# Patient Record
Sex: MALE | Race: WHITE | Employment: FULL TIME | URBAN - METROPOLITAN AREA
[De-identification: names, ages, dates, MRNs, and addresses within clinical notes are randomized per-mention and may not be internally consistent; named-entity substitution may affect disease eponyms.]

---

## 2021-01-06 ENCOUNTER — HOSPITAL ENCOUNTER (INPATIENT)
Age: 36
LOS: 5 days | Discharge: HOME | DRG: 896 | End: 2021-01-11
Attending: EMERGENCY MEDICINE | Admitting: INTERNAL MEDICINE
Payer: COMMERCIAL

## 2021-01-06 ENCOUNTER — APPOINTMENT (OUTPATIENT)
Dept: CT IMAGING | Age: 36
DRG: 896 | End: 2021-01-06
Payer: COMMERCIAL

## 2021-01-06 DIAGNOSIS — E87.1 HYPONATREMIA: ICD-10-CM

## 2021-01-06 DIAGNOSIS — F10.11 HISTORY OF ALCOHOL ABUSE: ICD-10-CM

## 2021-01-06 DIAGNOSIS — R40.2422 GLASGOW COMA SCALE TOTAL SCORE 9-12, AT ARRIVAL TO EMERGENCY DEPARTMENT: Primary | ICD-10-CM

## 2021-01-06 PROBLEM — Z78.9 ALCOHOL USE: Chronic | Status: ACTIVE | Noted: 2021-01-06

## 2021-01-06 PROBLEM — R41.82 ALTERED MENTAL STATUS: Status: ACTIVE | Noted: 2021-01-06

## 2021-01-06 PROBLEM — S01.91XA LACERATION OF HEAD WITHOUT FOREIGN BODY: Status: ACTIVE | Noted: 2021-01-06

## 2021-01-06 LAB
ACETAMINOPHEN LEVEL: <5 UG/ML (ref 10–30)
ALBUMIN SERPL-MCNC: 4.3 G/DL (ref 3.5–4.6)
ALP BLD-CCNC: 76 U/L (ref 35–104)
ALT SERPL-CCNC: 34 U/L (ref 0–41)
AMMONIA: 28 UMOL/L (ref 16–60)
AMPHETAMINE SCREEN, URINE: NORMAL
ANION GAP SERPL CALCULATED.3IONS-SCNC: 8 MEQ/L (ref 9–15)
AST SERPL-CCNC: 81 U/L (ref 0–40)
BACTERIA: NEGATIVE /HPF
BARBITURATE SCREEN URINE: NORMAL
BASOPHILS ABSOLUTE: 0 K/UL (ref 0–0.2)
BASOPHILS RELATIVE PERCENT: 0.3 %
BENZODIAZEPINE SCREEN, URINE: NORMAL
BILIRUB SERPL-MCNC: 1.1 MG/DL (ref 0.2–0.7)
BILIRUBIN URINE: NEGATIVE
BLOOD, URINE: ABNORMAL
BUN BLDV-MCNC: 4 MG/DL (ref 6–20)
CALCIUM SERPL-MCNC: 8.3 MG/DL (ref 8.5–9.9)
CANNABINOID SCREEN URINE: NORMAL
CHLORIDE BLD-SCNC: 80 MEQ/L (ref 95–107)
CLARITY: CLEAR
CO2: 24 MEQ/L (ref 20–31)
COCAINE METABOLITE SCREEN URINE: NORMAL
COLOR: YELLOW
CREAT SERPL-MCNC: 0.58 MG/DL (ref 0.7–1.2)
EOSINOPHILS ABSOLUTE: 0 K/UL (ref 0–0.7)
EOSINOPHILS RELATIVE PERCENT: 0.3 %
EPITHELIAL CELLS, UA: ABNORMAL /HPF (ref 0–5)
ETHANOL PERCENT: NORMAL G/DL
ETHANOL: <10 MG/DL (ref 0–0.08)
GFR AFRICAN AMERICAN: >60
GFR NON-AFRICAN AMERICAN: >60
GLOBULIN: 2.2 G/DL (ref 2.3–3.5)
GLUCOSE BLD-MCNC: 110 MG/DL (ref 70–99)
GLUCOSE URINE: NEGATIVE MG/DL
HCT VFR BLD CALC: 40.5 % (ref 42–52)
HEMOGLOBIN: 14.2 G/DL (ref 14–18)
HYALINE CASTS: ABNORMAL /HPF (ref 0–5)
KETONES, URINE: 15 MG/DL
LACTIC ACID: 1.9 MMOL/L (ref 0.5–2.2)
LEUKOCYTE ESTERASE, URINE: NEGATIVE
LYMPHOCYTES ABSOLUTE: 0.6 K/UL (ref 1–4.8)
LYMPHOCYTES RELATIVE PERCENT: 7.6 %
Lab: NORMAL
MAGNESIUM: 1.8 MG/DL (ref 1.7–2.4)
MCH RBC QN AUTO: 30.9 PG (ref 27–31.3)
MCHC RBC AUTO-ENTMCNC: 35.1 % (ref 33–37)
MCV RBC AUTO: 88.2 FL (ref 80–100)
METHADONE SCREEN, URINE: NORMAL
MONOCYTES ABSOLUTE: 0.7 K/UL (ref 0.2–0.8)
MONOCYTES RELATIVE PERCENT: 9.4 %
NEUTROPHILS ABSOLUTE: 6.1 K/UL (ref 1.4–6.5)
NEUTROPHILS RELATIVE PERCENT: 82.4 %
NITRITE, URINE: NEGATIVE
OPIATE SCREEN URINE: NORMAL
OXYCODONE URINE: NORMAL
PDW BLD-RTO: 12.9 % (ref 11.5–14.5)
PH UA: 7 (ref 5–9)
PHENCYCLIDINE SCREEN URINE: NORMAL
PLATELET # BLD: 139 K/UL (ref 130–400)
POTASSIUM SERPL-SCNC: 3.5 MEQ/L (ref 3.4–4.9)
PROPOXYPHENE SCREEN: NORMAL
PROTEIN UA: NEGATIVE MG/DL
RBC # BLD: 4.59 M/UL (ref 4.7–6.1)
RBC UA: ABNORMAL /HPF (ref 0–5)
SALICYLATE, SERUM: <0.3 MG/DL (ref 15–30)
SARS-COV-2, NAAT: NOT DETECTED
SODIUM BLD-SCNC: 112 MEQ/L (ref 135–144)
SODIUM BLD-SCNC: 115 MEQ/L (ref 135–144)
SPECIFIC GRAVITY UA: 1 (ref 1–1.03)
TOTAL PROTEIN: 6.5 G/DL (ref 6.3–8)
URINE REFLEX TO CULTURE: ABNORMAL
UROBILINOGEN, URINE: 0.2 E.U./DL
WBC # BLD: 7.4 K/UL (ref 4.8–10.8)
WBC UA: ABNORMAL /HPF (ref 0–5)

## 2021-01-06 PROCEDURE — 6360000002 HC RX W HCPCS: Performed by: EMERGENCY MEDICINE

## 2021-01-06 PROCEDURE — 83935 ASSAY OF URINE OSMOLALITY: CPT

## 2021-01-06 PROCEDURE — 82140 ASSAY OF AMMONIA: CPT

## 2021-01-06 PROCEDURE — 96372 THER/PROPH/DIAG INJ SC/IM: CPT

## 2021-01-06 PROCEDURE — 99284 EMERGENCY DEPT VISIT MOD MDM: CPT

## 2021-01-06 PROCEDURE — 83735 ASSAY OF MAGNESIUM: CPT

## 2021-01-06 PROCEDURE — 2000000000 HC ICU R&B

## 2021-01-06 PROCEDURE — 70450 CT HEAD/BRAIN W/O DYE: CPT

## 2021-01-06 PROCEDURE — 84295 ASSAY OF SERUM SODIUM: CPT

## 2021-01-06 PROCEDURE — 84300 ASSAY OF URINE SODIUM: CPT

## 2021-01-06 PROCEDURE — G0480 DRUG TEST DEF 1-7 CLASSES: HCPCS

## 2021-01-06 PROCEDURE — 72125 CT NECK SPINE W/O DYE: CPT

## 2021-01-06 PROCEDURE — 80307 DRUG TEST PRSMV CHEM ANLYZR: CPT

## 2021-01-06 PROCEDURE — 2580000003 HC RX 258: Performed by: NURSE PRACTITIONER

## 2021-01-06 PROCEDURE — 83605 ASSAY OF LACTIC ACID: CPT

## 2021-01-06 PROCEDURE — 2580000003 HC RX 258: Performed by: EMERGENCY MEDICINE

## 2021-01-06 PROCEDURE — 96375 TX/PRO/DX INJ NEW DRUG ADDON: CPT

## 2021-01-06 PROCEDURE — 81001 URINALYSIS AUTO W/SCOPE: CPT

## 2021-01-06 PROCEDURE — 2500000003 HC RX 250 WO HCPCS: Performed by: EMERGENCY MEDICINE

## 2021-01-06 PROCEDURE — 80053 COMPREHEN METABOLIC PANEL: CPT

## 2021-01-06 PROCEDURE — 6360000002 HC RX W HCPCS

## 2021-01-06 PROCEDURE — 96374 THER/PROPH/DIAG INJ IV PUSH: CPT

## 2021-01-06 PROCEDURE — 96376 TX/PRO/DX INJ SAME DRUG ADON: CPT

## 2021-01-06 PROCEDURE — 36415 COLL VENOUS BLD VENIPUNCTURE: CPT

## 2021-01-06 PROCEDURE — 85025 COMPLETE CBC W/AUTO DIFF WBC: CPT

## 2021-01-06 PROCEDURE — U0002 COVID-19 LAB TEST NON-CDC: HCPCS

## 2021-01-06 PROCEDURE — 82570 ASSAY OF URINE CREATININE: CPT

## 2021-01-06 RX ORDER — PROMETHAZINE HYDROCHLORIDE 12.5 MG/1
12.5 TABLET ORAL EVERY 6 HOURS PRN
Status: DISCONTINUED | OUTPATIENT
Start: 2021-01-06 | End: 2021-01-11 | Stop reason: HOSPADM

## 2021-01-06 RX ORDER — LANOLIN ALCOHOL/MO/W.PET/CERES
100 CREAM (GRAM) TOPICAL DAILY
Status: DISCONTINUED | OUTPATIENT
Start: 2021-01-07 | End: 2021-01-07

## 2021-01-06 RX ORDER — LORAZEPAM 2 MG/ML
INJECTION INTRAMUSCULAR
Status: COMPLETED
Start: 2021-01-06 | End: 2021-01-06

## 2021-01-06 RX ORDER — LORAZEPAM 2 MG/ML
2 INJECTION INTRAMUSCULAR ONCE
Status: COMPLETED | OUTPATIENT
Start: 2021-01-06 | End: 2021-01-06

## 2021-01-06 RX ORDER — ACETAMINOPHEN 650 MG/1
650 SUPPOSITORY RECTAL EVERY 6 HOURS PRN
Status: DISCONTINUED | OUTPATIENT
Start: 2021-01-06 | End: 2021-01-11 | Stop reason: HOSPADM

## 2021-01-06 RX ORDER — LORAZEPAM 2 MG/ML
2 INJECTION INTRAMUSCULAR
Status: DISCONTINUED | OUTPATIENT
Start: 2021-01-06 | End: 2021-01-11 | Stop reason: HOSPADM

## 2021-01-06 RX ORDER — ACETAMINOPHEN 325 MG/1
650 TABLET ORAL EVERY 6 HOURS PRN
Status: DISCONTINUED | OUTPATIENT
Start: 2021-01-06 | End: 2021-01-11 | Stop reason: HOSPADM

## 2021-01-06 RX ORDER — LORAZEPAM 1 MG/1
3 TABLET ORAL
Status: DISCONTINUED | OUTPATIENT
Start: 2021-01-06 | End: 2021-01-11 | Stop reason: HOSPADM

## 2021-01-06 RX ORDER — 3% SODIUM CHLORIDE 3 G/100ML
40 INJECTION, SOLUTION INTRAVENOUS CONTINUOUS
Status: DISCONTINUED | OUTPATIENT
Start: 2021-01-06 | End: 2021-01-07

## 2021-01-06 RX ORDER — LORAZEPAM 2 MG/ML
3 INJECTION INTRAMUSCULAR
Status: DISCONTINUED | OUTPATIENT
Start: 2021-01-06 | End: 2021-01-11 | Stop reason: HOSPADM

## 2021-01-06 RX ORDER — CHLORDIAZEPOXIDE HYDROCHLORIDE 25 MG/1
50 CAPSULE, GELATIN COATED ORAL ONCE
Status: DISCONTINUED | OUTPATIENT
Start: 2021-01-06 | End: 2021-01-06

## 2021-01-06 RX ORDER — MULTIVITAMIN WITH IRON
1 TABLET ORAL DAILY
Status: DISCONTINUED | OUTPATIENT
Start: 2021-01-07 | End: 2021-01-11 | Stop reason: HOSPADM

## 2021-01-06 RX ORDER — SODIUM CHLORIDE 0.9 % (FLUSH) 0.9 %
10 SYRINGE (ML) INJECTION PRN
Status: DISCONTINUED | OUTPATIENT
Start: 2021-01-06 | End: 2021-01-11 | Stop reason: HOSPADM

## 2021-01-06 RX ORDER — LORAZEPAM 1 MG/1
4 TABLET ORAL
Status: DISCONTINUED | OUTPATIENT
Start: 2021-01-06 | End: 2021-01-11 | Stop reason: HOSPADM

## 2021-01-06 RX ORDER — ONDANSETRON 2 MG/ML
4 INJECTION INTRAMUSCULAR; INTRAVENOUS ONCE
Status: COMPLETED | OUTPATIENT
Start: 2021-01-06 | End: 2021-01-06

## 2021-01-06 RX ORDER — HALOPERIDOL 5 MG/ML
10 INJECTION INTRAMUSCULAR ONCE
Status: COMPLETED | OUTPATIENT
Start: 2021-01-06 | End: 2021-01-06

## 2021-01-06 RX ORDER — SODIUM CHLORIDE 0.9 % (FLUSH) 0.9 %
10 SYRINGE (ML) INJECTION EVERY 12 HOURS SCHEDULED
Status: DISCONTINUED | OUTPATIENT
Start: 2021-01-06 | End: 2021-01-11 | Stop reason: HOSPADM

## 2021-01-06 RX ORDER — LORAZEPAM 2 MG/ML
4 INJECTION INTRAMUSCULAR
Status: DISCONTINUED | OUTPATIENT
Start: 2021-01-06 | End: 2021-01-11 | Stop reason: HOSPADM

## 2021-01-06 RX ORDER — LORAZEPAM 1 MG/1
1 TABLET ORAL
Status: DISCONTINUED | OUTPATIENT
Start: 2021-01-06 | End: 2021-01-11 | Stop reason: HOSPADM

## 2021-01-06 RX ORDER — LORAZEPAM 2 MG/ML
1 INJECTION INTRAMUSCULAR
Status: DISCONTINUED | OUTPATIENT
Start: 2021-01-06 | End: 2021-01-11 | Stop reason: HOSPADM

## 2021-01-06 RX ORDER — ONDANSETRON 2 MG/ML
4 INJECTION INTRAMUSCULAR; INTRAVENOUS EVERY 6 HOURS PRN
Status: DISCONTINUED | OUTPATIENT
Start: 2021-01-06 | End: 2021-01-11 | Stop reason: HOSPADM

## 2021-01-06 RX ORDER — LORAZEPAM 1 MG/1
2 TABLET ORAL
Status: DISCONTINUED | OUTPATIENT
Start: 2021-01-06 | End: 2021-01-11 | Stop reason: HOSPADM

## 2021-01-06 RX ORDER — POLYETHYLENE GLYCOL 3350 17 G/17G
17 POWDER, FOR SOLUTION ORAL DAILY PRN
Status: DISCONTINUED | OUTPATIENT
Start: 2021-01-06 | End: 2021-01-11 | Stop reason: HOSPADM

## 2021-01-06 RX ADMIN — LORAZEPAM 2 MG: 2 INJECTION, SOLUTION INTRAMUSCULAR; INTRAVENOUS at 19:40

## 2021-01-06 RX ADMIN — ONDANSETRON 4 MG: 2 INJECTION INTRAMUSCULAR; INTRAVENOUS at 17:56

## 2021-01-06 RX ADMIN — HALOPERIDOL LACTATE 10 MG: 5 INJECTION, SOLUTION INTRAMUSCULAR at 18:38

## 2021-01-06 RX ADMIN — LORAZEPAM 2 MG: 2 INJECTION, SOLUTION INTRAMUSCULAR; INTRAVENOUS at 20:27

## 2021-01-06 RX ADMIN — THIAMINE HYDROCHLORIDE: 100 INJECTION, SOLUTION INTRAMUSCULAR; INTRAVENOUS at 17:52

## 2021-01-06 RX ADMIN — LORAZEPAM 2 MG: 2 INJECTION INTRAMUSCULAR; INTRAVENOUS at 22:47

## 2021-01-06 RX ADMIN — LORAZEPAM 2 MG: 2 INJECTION INTRAMUSCULAR at 17:15

## 2021-01-06 RX ADMIN — SODIUM CHLORIDE 40 ML/HR: 3 INJECTION, SOLUTION INTRAVENOUS at 21:20

## 2021-01-06 RX ADMIN — Medication 10 ML: at 23:28

## 2021-01-06 RX ADMIN — LORAZEPAM 2 MG: 2 INJECTION, SOLUTION INTRAMUSCULAR; INTRAVENOUS at 17:15

## 2021-01-06 ASSESSMENT — PAIN SCALES - GENERAL: PAINLEVEL_OUTOF10: 0

## 2021-01-06 NOTE — ED TRIAGE NOTES
Pt to ED with his wife after being found unresponsive at home on his kitchen floor with blood around him. Pt alert, speech incomprehensible. Follows few commands. Tremors noted. Small lac noted to right posterior head. Per his wife, he was a daily drinker and stopped on 1/1/21.

## 2021-01-06 NOTE — Clinical Note
Patient Class: Inpatient [101]   REQUIRED: Diagnosis: Hyponatremia [[id number]]   Estimated Length of Stay: Estimated stay of more than 2 midnights   Admitting Provider: Edson Hercules [[id number]]   Telemetry Bed Required?: Yes

## 2021-01-06 NOTE — ED NOTES
Patient to room #10 with wife at bedside. Patient will not follow any commands, pupils equal, round, react to light, patient noted to have abrasion with hematoma on right posterior of head. Patient noted to have tremors with sudden rapid movements. SL #18 placed to right AC. Unable to obtain blood pressure at this time due to patient pulling at cuff and rapidly moving arm.      Mariella Giles RN  01/06/21 5855

## 2021-01-06 NOTE — ED NOTES
Patient condition remains the same. Patient unable to take Librium.  to place further orders.       Princess Acevedo RN  01/06/21 6747

## 2021-01-06 NOTE — ED NOTES
Patient condition remains unchanged. Unable to stop rapid movements. Unable to go to CT at this time due to the inability to hold still and follow instructions. Mary Landry notified.       Samina Beal RN  01/06/21 3234

## 2021-01-07 ENCOUNTER — APPOINTMENT (OUTPATIENT)
Dept: GENERAL RADIOLOGY | Age: 36
DRG: 896 | End: 2021-01-07
Payer: COMMERCIAL

## 2021-01-07 LAB
ALBUMIN SERPL-MCNC: 3.9 G/DL (ref 3.5–4.6)
ALP BLD-CCNC: 71 U/L (ref 35–104)
ALT SERPL-CCNC: 37 U/L (ref 0–41)
ANION GAP SERPL CALCULATED.3IONS-SCNC: 10 MEQ/L (ref 9–15)
ANION GAP SERPL CALCULATED.3IONS-SCNC: 10 MEQ/L (ref 9–15)
ANION GAP SERPL CALCULATED.3IONS-SCNC: 12 MEQ/L (ref 9–15)
ANION GAP SERPL CALCULATED.3IONS-SCNC: 16 MEQ/L (ref 9–15)
ANION GAP SERPL CALCULATED.3IONS-SCNC: 16 MEQ/L (ref 9–15)
AST SERPL-CCNC: 111 U/L (ref 0–40)
BASOPHILS ABSOLUTE: 0 K/UL (ref 0–0.2)
BASOPHILS RELATIVE PERCENT: 0.2 %
BILIRUB SERPL-MCNC: 1.1 MG/DL (ref 0.2–0.7)
BUN BLDV-MCNC: 4 MG/DL (ref 6–20)
BUN BLDV-MCNC: 5 MG/DL (ref 6–20)
BUN BLDV-MCNC: 6 MG/DL (ref 6–20)
CALCIUM SERPL-MCNC: 8.3 MG/DL (ref 8.5–9.9)
CALCIUM SERPL-MCNC: 8.4 MG/DL (ref 8.5–9.9)
CALCIUM SERPL-MCNC: 8.6 MG/DL (ref 8.5–9.9)
CALCIUM SERPL-MCNC: 8.9 MG/DL (ref 8.5–9.9)
CALCIUM SERPL-MCNC: 9.3 MG/DL (ref 8.5–9.9)
CHLORIDE BLD-SCNC: 101 MEQ/L (ref 95–107)
CHLORIDE BLD-SCNC: 88 MEQ/L (ref 95–107)
CHLORIDE BLD-SCNC: 89 MEQ/L (ref 95–107)
CHLORIDE BLD-SCNC: 99 MEQ/L (ref 95–107)
CHLORIDE BLD-SCNC: 99 MEQ/L (ref 95–107)
CO2: 19 MEQ/L (ref 20–31)
CO2: 19 MEQ/L (ref 20–31)
CO2: 21 MEQ/L (ref 20–31)
CO2: 21 MEQ/L (ref 20–31)
CO2: 22 MEQ/L (ref 20–31)
CREAT SERPL-MCNC: 0.66 MG/DL (ref 0.7–1.2)
CREAT SERPL-MCNC: 0.68 MG/DL (ref 0.7–1.2)
CREAT SERPL-MCNC: 0.71 MG/DL (ref 0.7–1.2)
CREAT SERPL-MCNC: 0.73 MG/DL (ref 0.7–1.2)
CREAT SERPL-MCNC: 0.78 MG/DL (ref 0.7–1.2)
CREATININE URINE: 20.5 MG/DL
EOSINOPHILS ABSOLUTE: 0 K/UL (ref 0–0.7)
EOSINOPHILS RELATIVE PERCENT: 0.1 %
GFR AFRICAN AMERICAN: >60
GFR NON-AFRICAN AMERICAN: >60
GLOBULIN: 2.4 G/DL (ref 2.3–3.5)
GLUCOSE BLD-MCNC: 108 MG/DL (ref 60–115)
GLUCOSE BLD-MCNC: 112 MG/DL (ref 70–99)
GLUCOSE BLD-MCNC: 119 MG/DL (ref 60–115)
GLUCOSE BLD-MCNC: 120 MG/DL (ref 70–99)
GLUCOSE BLD-MCNC: 121 MG/DL (ref 70–99)
GLUCOSE BLD-MCNC: 128 MG/DL (ref 70–99)
GLUCOSE BLD-MCNC: 138 MG/DL (ref 60–115)
GLUCOSE BLD-MCNC: 143 MG/DL (ref 70–99)
HCT VFR BLD CALC: 38.4 % (ref 42–52)
HEMOGLOBIN: 13.5 G/DL (ref 14–18)
LYMPHOCYTES ABSOLUTE: 0.4 K/UL (ref 1–4.8)
LYMPHOCYTES RELATIVE PERCENT: 4.9 %
MAGNESIUM: 2.3 MG/DL (ref 1.7–2.4)
MCH RBC QN AUTO: 31.6 PG (ref 27–31.3)
MCHC RBC AUTO-ENTMCNC: 35.2 % (ref 33–37)
MCV RBC AUTO: 90 FL (ref 80–100)
MONOCYTES ABSOLUTE: 0.8 K/UL (ref 0.2–0.8)
MONOCYTES RELATIVE PERCENT: 11.2 %
NEUTROPHILS ABSOLUTE: 6.3 K/UL (ref 1.4–6.5)
NEUTROPHILS RELATIVE PERCENT: 83.6 %
OSMOLALITY URINE: 135 MOSM/KG (ref 300–900)
OSMOLALITY: 246 MOSM/KG (ref 280–303)
PDW BLD-RTO: 13.3 % (ref 11.5–14.5)
PERFORMED ON: ABNORMAL
PERFORMED ON: ABNORMAL
PERFORMED ON: NORMAL
PHOSPHORUS: 2.1 MG/DL (ref 2.3–4.8)
PHOSPHORUS: 2.4 MG/DL (ref 2.3–4.8)
PHOSPHORUS: 2.9 MG/DL (ref 2.3–4.8)
PHOSPHORUS: 3 MG/DL (ref 2.3–4.8)
PLATELET # BLD: 121 K/UL (ref 130–400)
POTASSIUM REFLEX MAGNESIUM: 3.3 MEQ/L (ref 3.4–4.9)
POTASSIUM REFLEX MAGNESIUM: 3.6 MEQ/L (ref 3.4–4.9)
POTASSIUM REFLEX MAGNESIUM: 3.6 MEQ/L (ref 3.4–4.9)
POTASSIUM REFLEX MAGNESIUM: 3.7 MEQ/L (ref 3.4–4.9)
POTASSIUM REFLEX MAGNESIUM: 3.9 MEQ/L (ref 3.4–4.9)
PROCALCITONIN: 0.2 NG/ML (ref 0–0.15)
RBC # BLD: 4.27 M/UL (ref 4.7–6.1)
SODIUM BLD-SCNC: 112 MEQ/L (ref 135–144)
SODIUM BLD-SCNC: 113 MEQ/L (ref 135–144)
SODIUM BLD-SCNC: 118 MEQ/L (ref 135–144)
SODIUM BLD-SCNC: 120 MEQ/L (ref 135–144)
SODIUM BLD-SCNC: 123 MEQ/L (ref 135–144)
SODIUM BLD-SCNC: 124 MEQ/L (ref 135–144)
SODIUM BLD-SCNC: 132 MEQ/L (ref 135–144)
SODIUM BLD-SCNC: 132 MEQ/L (ref 135–144)
SODIUM BLD-SCNC: 134 MEQ/L (ref 135–144)
SODIUM URINE: 30 MEQ/L
TOTAL PROTEIN: 6.3 G/DL (ref 6.3–8)
WBC # BLD: 7.6 K/UL (ref 4.8–10.8)

## 2021-01-07 PROCEDURE — 80053 COMPREHEN METABOLIC PANEL: CPT

## 2021-01-07 PROCEDURE — 85025 COMPLETE CBC W/AUTO DIFF WBC: CPT

## 2021-01-07 PROCEDURE — 2580000003 HC RX 258: Performed by: INTERNAL MEDICINE

## 2021-01-07 PROCEDURE — 84145 PROCALCITONIN (PCT): CPT

## 2021-01-07 PROCEDURE — 84295 ASSAY OF SERUM SODIUM: CPT

## 2021-01-07 PROCEDURE — 71045 X-RAY EXAM CHEST 1 VIEW: CPT

## 2021-01-07 PROCEDURE — 2500000003 HC RX 250 WO HCPCS: Performed by: INTERNAL MEDICINE

## 2021-01-07 PROCEDURE — 6370000000 HC RX 637 (ALT 250 FOR IP): Performed by: NURSE PRACTITIONER

## 2021-01-07 PROCEDURE — 87040 BLOOD CULTURE FOR BACTERIA: CPT

## 2021-01-07 PROCEDURE — 6360000002 HC RX W HCPCS: Performed by: INTERNAL MEDICINE

## 2021-01-07 PROCEDURE — 84100 ASSAY OF PHOSPHORUS: CPT

## 2021-01-07 PROCEDURE — 6370000000 HC RX 637 (ALT 250 FOR IP): Performed by: INTERNAL MEDICINE

## 2021-01-07 PROCEDURE — 99291 CRITICAL CARE FIRST HOUR: CPT | Performed by: INTERNAL MEDICINE

## 2021-01-07 PROCEDURE — 51702 INSERT TEMP BLADDER CATH: CPT

## 2021-01-07 PROCEDURE — 2580000003 HC RX 258: Performed by: STUDENT IN AN ORGANIZED HEALTH CARE EDUCATION/TRAINING PROGRAM

## 2021-01-07 PROCEDURE — 2580000003 HC RX 258: Performed by: NURSE PRACTITIONER

## 2021-01-07 PROCEDURE — 83735 ASSAY OF MAGNESIUM: CPT

## 2021-01-07 PROCEDURE — 6360000002 HC RX W HCPCS: Performed by: STUDENT IN AN ORGANIZED HEALTH CARE EDUCATION/TRAINING PROGRAM

## 2021-01-07 PROCEDURE — 83930 ASSAY OF BLOOD OSMOLALITY: CPT

## 2021-01-07 PROCEDURE — 6360000002 HC RX W HCPCS: Performed by: NURSE PRACTITIONER

## 2021-01-07 PROCEDURE — 2000000000 HC ICU R&B

## 2021-01-07 PROCEDURE — 36415 COLL VENOUS BLD VENIPUNCTURE: CPT

## 2021-01-07 RX ORDER — CHLORDIAZEPOXIDE HYDROCHLORIDE 10 MG/1
20 CAPSULE, GELATIN COATED ORAL 3 TIMES DAILY
Status: DISCONTINUED | OUTPATIENT
Start: 2021-01-07 | End: 2021-01-10

## 2021-01-07 RX ORDER — CHLORDIAZEPOXIDE HYDROCHLORIDE 10 MG/1
10 CAPSULE, GELATIN COATED ORAL 3 TIMES DAILY
Status: DISCONTINUED | OUTPATIENT
Start: 2021-01-07 | End: 2021-01-07

## 2021-01-07 RX ORDER — SODIUM CHLORIDE 450 MG/100ML
INJECTION, SOLUTION INTRAVENOUS CONTINUOUS
Status: DISCONTINUED | OUTPATIENT
Start: 2021-01-07 | End: 2021-01-08

## 2021-01-07 RX ORDER — SODIUM CHLORIDE 9 MG/ML
INJECTION, SOLUTION INTRAVENOUS CONTINUOUS
Status: DISCONTINUED | OUTPATIENT
Start: 2021-01-07 | End: 2021-01-07

## 2021-01-07 RX ORDER — DESMOPRESSIN ACETATE 4 UG/ML
2 INJECTION, SOLUTION INTRAVENOUS; SUBCUTANEOUS ONCE
Status: COMPLETED | OUTPATIENT
Start: 2021-01-07 | End: 2021-01-07

## 2021-01-07 RX ORDER — DEXTROSE MONOHYDRATE 50 MG/ML
INJECTION, SOLUTION INTRAVENOUS CONTINUOUS
Status: DISCONTINUED | OUTPATIENT
Start: 2021-01-07 | End: 2021-01-09

## 2021-01-07 RX ORDER — LORAZEPAM 2 MG/ML
1 INJECTION INTRAMUSCULAR ONCE
Status: COMPLETED | OUTPATIENT
Start: 2021-01-07 | End: 2021-01-07

## 2021-01-07 RX ADMIN — LORAZEPAM 4 MG: 2 INJECTION INTRAMUSCULAR; INTRAVENOUS at 02:19

## 2021-01-07 RX ADMIN — LORAZEPAM 4 MG: 2 INJECTION INTRAMUSCULAR; INTRAVENOUS at 00:53

## 2021-01-07 RX ADMIN — THIAMINE HYDROCHLORIDE: 100 INJECTION, SOLUTION INTRAMUSCULAR; INTRAVENOUS at 11:45

## 2021-01-07 RX ADMIN — SODIUM CHLORIDE 1.4 MCG/KG/HR: 9 INJECTION, SOLUTION INTRAVENOUS at 21:51

## 2021-01-07 RX ADMIN — SODIUM CHLORIDE 1.5 G: 900 INJECTION INTRAVENOUS at 10:59

## 2021-01-07 RX ADMIN — LORAZEPAM 1 MG: 2 INJECTION INTRAMUSCULAR; INTRAVENOUS at 10:38

## 2021-01-07 RX ADMIN — SODIUM CHLORIDE 1.5 G: 900 INJECTION INTRAVENOUS at 22:59

## 2021-01-07 RX ADMIN — LORAZEPAM 4 MG: 2 INJECTION INTRAMUSCULAR; INTRAVENOUS at 10:00

## 2021-01-07 RX ADMIN — DEXTROSE MONOHYDRATE: 50 INJECTION, SOLUTION INTRAVENOUS at 16:48

## 2021-01-07 RX ADMIN — SODIUM CHLORIDE 0.2 MCG/KG/HR: 9 INJECTION, SOLUTION INTRAVENOUS at 04:10

## 2021-01-07 RX ADMIN — THERA TABS 1 TABLET: TAB at 14:13

## 2021-01-07 RX ADMIN — LORAZEPAM 4 MG: 2 INJECTION INTRAMUSCULAR; INTRAVENOUS at 09:00

## 2021-01-07 RX ADMIN — SODIUM CHLORIDE 1.3 MCG/KG/HR: 9 INJECTION, SOLUTION INTRAVENOUS at 10:05

## 2021-01-07 RX ADMIN — SODIUM CHLORIDE 1.3 MCG/KG/HR: 9 INJECTION, SOLUTION INTRAVENOUS at 13:05

## 2021-01-07 RX ADMIN — LORAZEPAM 4 MG: 2 INJECTION INTRAMUSCULAR; INTRAVENOUS at 17:42

## 2021-01-07 RX ADMIN — SODIUM CHLORIDE: 4.5 INJECTION, SOLUTION INTRAVENOUS at 10:43

## 2021-01-07 RX ADMIN — FOLIC ACID 1 MG: 5 INJECTION, SOLUTION INTRAMUSCULAR; INTRAVENOUS; SUBCUTANEOUS at 10:55

## 2021-01-07 RX ADMIN — DESMOPRESSIN ACETATE 2 MCG: 4 SOLUTION INTRAVENOUS at 17:08

## 2021-01-07 RX ADMIN — SODIUM CHLORIDE 1.4 MCG/KG/HR: 9 INJECTION, SOLUTION INTRAVENOUS at 18:02

## 2021-01-07 RX ADMIN — Medication 10 ML: at 20:49

## 2021-01-07 RX ADMIN — LORAZEPAM 4 MG: 2 INJECTION INTRAMUSCULAR; INTRAVENOUS at 07:00

## 2021-01-07 RX ADMIN — CHLORDIAZEPOXIDE HYDROCHLORIDE 20 MG: 10 CAPSULE ORAL at 14:14

## 2021-01-07 RX ADMIN — DEXTROSE MONOHYDRATE: 50 INJECTION, SOLUTION INTRAVENOUS at 23:47

## 2021-01-07 RX ADMIN — SODIUM CHLORIDE 1.5 G: 900 INJECTION INTRAVENOUS at 16:48

## 2021-01-07 RX ADMIN — CHLORDIAZEPOXIDE HYDROCHLORIDE 20 MG: 10 CAPSULE ORAL at 20:49

## 2021-01-07 RX ADMIN — LORAZEPAM 4 MG: 2 INJECTION INTRAMUSCULAR; INTRAVENOUS at 04:32

## 2021-01-07 RX ADMIN — LORAZEPAM 2 MG: 2 INJECTION INTRAMUSCULAR; INTRAVENOUS at 22:55

## 2021-01-07 ASSESSMENT — ENCOUNTER SYMPTOMS
NAUSEA: 0
VOMITING: 0
CHEST TIGHTNESS: 0
SORE THROAT: 0
SHORTNESS OF BREATH: 0
EYE PAIN: 0
ABDOMINAL PAIN: 0

## 2021-01-07 ASSESSMENT — PAIN SCALES - GENERAL
PAINLEVEL_OUTOF10: 0

## 2021-01-07 NOTE — CARE COORDINATION
PATIENT TO ICU FROM ED, WIFE AT BEDSIDE FOR INTERDISCIPLINARY ROUNDS. PATIENT SLEEPING DURING ROUNDS. WILL FOLLOW UP WITH WIFE FOR CM ASSESSMENT.

## 2021-01-07 NOTE — PROGRESS NOTES
Comprehensive Nutrition Assessment    Type and Reason for Visit:  Initial(new TF)    Nutrition Recommendations/Plan:   Start Standard with Fiber TF ( Jevity 1.2) @ 20 ml/hr x 24 hrs   Water flush 50 ml 4 x daily  If pt to remain dependent on TF, recommend formula change to  1.5 with fiber ( Jevity 1.5) @ goal rate of 60 ml/hr    Nutrition Assessment:  Pt admitted for hyponatremia, severe DT's from ETOH withdrawl, NG placed for meds /TF    Malnutrition Assessment:  Malnutrition Status:  No malnutrition    Context:  Social/Environmental Circumstances     Findings of the 6 clinical characteristics of malnutrition:  Energy Intake:  No significant decrease in energy intake  Weight Loss:  No significant weight loss     Body Fat Loss:  No significant body fat loss     Muscle Mass Loss:  No significant muscle mass loss    Fluid Accumulation:  No significant fluid accumulation     Strength:  Not Performed    Estimated Daily Nutrient Needs:  Energy (kcal):  3365-6791 kcals @ 25-28 kcal/kg; Weight Used for Energy Requirements:  Current(85.5 kg)     Protein (g):  103-110 g protein @ 1.2-1.3 g/kg;  Weight Used for Protein Requirements:  Current(85.5 kg)        Fluid (ml/day):  ~2400; Method Used for Fluid Requirements:  1 ml/kcal      Nutrition Related Findings:  Adequately nourished on admit, per visible assessment, on precedex, NG placed today, to start trophic TF, MARCELINO protocol, IVF = .45Nacl@ 50 ml/hr, recieving MIV, thiamine, folic acid replacement, Na = 124      Wounds:  None(abrasion back of head from fall))       Current Nutrition Therapies:    Current Tube Feeding (TF) Orders:  · Feeding Route: Nasogastric  · Formula: Standard w/Fiber  · Schedule: Continuous @ 20 ML/HR  · Water Flushes: 50 ml 6 x daily (300 ml)  · Current TF & Flush Orders Provides: 576 kcals, 27 g protien, ~690 ml free water  · Goal TF & Flush Orders Provides: 576 kcals, 27 g protien, ~690 ml free water    Anthropometric Measures:  · Height: 6' 1\" (185.4 cm)  · Current Body Weight: 188 lb (85.3 kg)   · Admission Body Weight: 175 lb (79.4 kg)(stated)    · Usual Body Weight: (n/a)     · Ideal Body Weight: 184 lbs;   · BMI: 24.8  · BMI Categories: Normal Weight (BMI 22.0 to 24.9) age over 72       Nutrition Diagnosis:   · Inadequate oral intake related to cognitive or neurological impairment as evidenced by NPO or clear liquid status due to medical condition      Nutrition Interventions:   Food and/or Nutrient Delivery:  Continue Current Tube Feeding(Start Standard with Fiber TF ( Jevity 1.2) @ 20 ml/hr x 24 hrs. Water flush 50 ml 4 x daily.)  Nutrition Education/Counseling:  Education not indicated   Coordination of Nutrition Care:  No recommendation at this time    Goals:  initation and tolerance of EN       Nutrition Monitoring and Evaluation:     Food/Nutrient Intake Outcomes:  Enteral Nutrition Intake/Tolerance  Physical Signs/Symptoms Outcomes:  Hemodynamic Status, Weight, Fluid Status or Edema, Biochemical Data     Discharge Planning:     Too soon to determine     Electronically signed by Isauro Alfaro RD, LD on 1/7/21 at 2:13 PM EST

## 2021-01-07 NOTE — PLAN OF CARE
Nutrition Problem #1: Inadequate oral intake  Intervention: Food and/or Nutrient Delivery: Continue Current Tube Feeding(Start Standard with Fiber TF ( Jevity 1.2) @ 20 ml/hr x 24 hrs.  Water flush 50 ml 4 x daily.)  Nutritional Goals: initation and tolerance of EN

## 2021-01-07 NOTE — ED NOTES
Patient attempting to climb out of bed. Urinated self. Cleaned up. Warm blankets applied. Urine was obtained.       Sarmad Sen RN  01/06/21 2033

## 2021-01-07 NOTE — PROGRESS NOTES
Hospitalist Daily Progress Note  Name: Gonzalez Hernandes  Age: 38 y.o. Gender: male  CodeStatus: Full Code  Allergies: No Known Allergies    Chief Complaint:Altered Mental Status    Primary Care Provider: No primary care provider on file. InpatientTreatment Team: Treatment Team: Attending Provider: Cathie Tatum DO; Consulting Physician: Renee Kenney MD; Utilization Reviewer: Boone Sapp RN; : Franko Estrada; Registered Nurse: Raj Mortensen RN; : Antwan Holley, [facility]; Consulting Physician: Flor Fofana MD  Admission Date: 1/6/2021      Subjective: Patient is seen evaluated bedside. Remains on high-dose Precedex infusion. Continues to receive large doses of Ativan hourly for alcohol withdrawal protocol. CorPak placed, imaging personally reviewed and good for use. Sodium from 115 up to 124 today. Case discussed with nephrology, critical care intensivist.     Physical Exam  Constitutional:       Comments: Lethargic, agitated when aroused   HENT:      Head: Normocephalic and atraumatic. Nose: Nose normal.      Mouth/Throat:      Mouth: Mucous membranes are moist.      Pharynx: Oropharynx is clear. Eyes:      Comments: Pupils dilated bilaterally   Neck:      Musculoskeletal: Normal range of motion. Cardiovascular:      Rate and Rhythm: Normal rate. Heart sounds: No murmur. No friction rub. No gallop. Pulmonary:      Effort: No respiratory distress. Breath sounds: No wheezing, rhonchi or rales. Abdominal:      General: There is no distension. Tenderness: There is no abdominal tenderness. There is no guarding. Musculoskeletal: Normal range of motion. Right lower leg: No edema. Left lower leg: No edema.    Neurological:      Comments: Obtunded no focal deficits strength intact throughout agitated   Psychiatric:      Comments: Unable to assess psychiatric condition given acute metabolic encephalopathy         Review of Systems  Unable to found for this or any previous visit. Portable:   Results for orders placed during the hospital encounter of 01/06/21   XR CHEST PORTABLE    Narrative EXAMINATION: CHEST PORTABLE VIEW     CLINICAL HISTORY: NG tube placement    COMPARISONS: January 7, 2021 0910 hours     FINDINGS:    A total of 3 supine portable films are submitted. On the final third image the nasogastric tube now lies within the stomach. .  The cardiac silhouette is borderline enlarged. Pulmonary vascular unremarkable. Right sided trachea. Small area of atelectasis, patchy infiltrate left lower lobe. No Pneumothoraces. Impression ON THE THIRD IMAGE NASOGASTRIC TUBE IS WITHIN THE STOMACH. SMALL AREA OF ATELECTASIS, PATCHY INFILTRATE LEFT LOWER LOBE         Echo No results found for this or any previous visit. Assessment/Plan:    Active Hospital Problems    Diagnosis Date Noted    Hyponatremia [E87.1] 01/06/2021    Altered mental status [R41.82] 01/06/2021    Laceration of head without foreign body [S01.91XA] 01/06/2021    Alcohol use [Z72.89] 01/06/2021     Severe alcoholic DT with acute encephalopathy: Continue scheduled Librium, Precedex. Ativan per alcohol withdrawal protocol. Continue thiamine and folate supplementation. Alcohol cessation education when awake alert and amenable to consultation    Hyponatremia with potential seizure: Sodium up to 124 from 112 yesterday. Nephrology following. 1/2 NS now. Goal 119/120 this evening    Possible aspiration: Repeat procalcitonin tomorrow. Start Unasyn. Pulmonary/critical care following. DVT prophylaxis held due to head injury. SCD for prophylaxis    Additional work up or/and treatment plan may be added today or then after based on clinical progression. I am managing a portion of pt care. Some medical issues are handled byother specialists.  Additional work up and treatment should be done in out pt setting by pt PCP and other out pt providers. In addition to examining and evaluating pt, I spent additional time explaining care, normaland abnormal findings, and treatment plan. All of pt questions were answered. Counseling, diet and education were provided. Case will be discussed with nursing staff when appropriate. Family will be updated if and whenappropriate.       Electronically signed by Brianda Yuan DO on 1/7/2021 at 12:27 PM

## 2021-01-07 NOTE — H&P
[facility]    HISTORY AND PHYSICAL EXAM    PATIENT NAME:  Claudene Jun    MRN:  [id number]  SERVICE DATE:  1/6/2021   SERVICE TIME:  9:24 PM    Primary Care Physician: No primary care provider on file. SUBJECTIVE  CHIEF COMPLAINT:  Altered mental status    HPI:  This is a 38 y.o. male who no significant medical history presents with altered mental status. Wife found unresponsive at home, lying on the floor with blood around his him. She did state that he has daily alcohol intake, he stopped drinking 1/1/2021. PAST MEDICAL HISTORY:  Non contributory  PAST SURGICAL HISTORY:  None  FAMILY HISTORY:  No family history on file.   SOCIAL HISTORY:    Social History     Socioeconomic History    Marital status:      Spouse name: Not on file    Number of children: Not on file    Years of education: Not on file    Highest education level: Not on file   Occupational History    Not on file   Social Needs    Financial resource strain: Not on file    Food insecurity     Worry: Not on file     Inability: Not on file    Transportation needs     Medical: Not on file     Non-medical: Not on file   Tobacco Use    Smoking status: Not on file   Substance and Sexual Activity    Alcohol use: Not on file    Drug use: Not on file    Sexual activity: Not on file   Lifestyle    Physical activity     Days per week: Not on file     Minutes per session: Not on file    Stress: Not on file   Relationships    Social connections     Talks on phone: Not on file     Gets together: Not on file     Attends [employer] service: Not on file     Active member of club or organization: Not on file     Attends meetings of clubs or organizations: Not on file     Relationship status: Not on file    Intimate partner violence     Fear of current or ex partner: Not on file     Emotionally abused: Not on file     Physically abused: Not on file     Forced sexual activity: Not on file   Other Topics Concern    Not on file   Social History Narrative    Not on file     MEDICATIONS:   Prior to Admission medications    Not on File       ALLERGIES: Patient has no known allergies. REVIEW OF SYSTEM:   Review of Systems   Unable to perform ROS: Mental status change        OBJECTIVE  PHYSICAL EXAM:   Physical Exam  Vitals signs and nursing note reviewed. Constitutional:       Appearance: He is ill-appearing and diaphoretic. HENT:      Head: Normocephalic. Comments: Laceration to right posterior head. Nose: Nose normal.      Mouth/Throat:      Mouth: Mucous membranes are moist.      Pharynx: Oropharynx is clear. Eyes:      Conjunctiva/sclera: Conjunctivae normal.   Cardiovascular:      Rate and Rhythm: Regular rhythm. Tachycardia present. Pulses: Normal pulses. Heart sounds: Normal heart sounds. Pulmonary:      Effort: Pulmonary effort is normal.      Breath sounds: Normal breath sounds. Abdominal:      General: Abdomen is flat. Bowel sounds are normal.      Palpations: Abdomen is soft. Musculoskeletal: Normal range of motion. General: No swelling. Right lower leg: No edema. Left lower leg: No edema. Skin:     General: Skin is warm. Capillary Refill: Capillary refill takes less than 2 seconds. Findings: Bruising and lesion present. Neurological:      Mental Status: He is disoriented. Comments: Agitated, unable to answer questions. /73   Pulse 71   Temp 98.4 °F (36.9 °C) (Oral)   Resp 22   Ht 6' 1\" (1.854 m)   Wt 175 lb (79.4 kg)   SpO2 100%   BMI 23.09 kg/m²     DATA:     Diagnostic tests reviewed for today's visit:    Most recent labs and imaging results reviewed.      LABS:    Recent Results (from the past 24 hour(s))   Lactic Acid, Plasma    Collection Time: 01/06/21  5:27 PM   Result Value Ref Range    Lactic Acid 1.9 0.5 - 2.2 mmol/L   Ethanol    Collection Time: 01/06/21  5:27 PM   Result Value Ref Range    Ethanol Lvl <10 mg/dL Ethanol percent Not indicated G/dL   CBC Auto Differential    Collection Time: 01/06/21  5:30 PM   Result Value Ref Range    WBC 7.4 4.8 - 10.8 K/uL    RBC 4.59 (L) 4.70 - 6.10 M/uL    Hemoglobin 14.2 14.0 - 18.0 g/dL    Hematocrit 40.5 (L) 42.0 - 52.0 %    MCV 88.2 80.0 - 100.0 fL    MCH 30.9 27.0 - 31.3 pg    MCHC 35.1 33.0 - 37.0 %    RDW 12.9 11.5 - 14.5 %    Platelets 686 340 - 004 K/uL    Neutrophils % 82.4 %    Lymphocytes % 7.6 %    Monocytes % 9.4 %    Eosinophils % 0.3 %    Basophils % 0.3 %    Neutrophils Absolute 6.1 1.4 - 6.5 K/uL    Lymphocytes Absolute 0.6 (L) 1.0 - 4.8 K/uL    Monocytes Absolute 0.7 0.2 - 0.8 K/uL    Eosinophils Absolute 0.0 0.0 - 0.7 K/uL    Basophils Absolute 0.0 0.0 - 0.2 K/uL   Urine Reflex to Culture    Collection Time: 01/06/21  5:30 PM    Specimen: Urine, clean catch   Result Value Ref Range    Color, UA Yellow Straw/Yellow    Clarity, UA Clear Clear    Glucose, Ur Negative Negative mg/dL    Bilirubin Urine Negative Negative    Ketones, Urine 15 (A) Negative mg/dL    Specific Gravity, UA 1.004 1.005 - 1.030    Blood, Urine SMALL (A) Negative    pH, UA 7.0 5.0 - 9.0    Protein, UA Negative Negative mg/dL    Urobilinogen, Urine 0.2 <2.0 E.U./dL    Nitrite, Urine Negative Negative    Leukocyte Esterase, Urine Negative Negative    Urine Reflex to Culture Not Indicated    Urine Drug Screen    Collection Time: 01/06/21  5:30 PM   Result Value Ref Range    Amphetamine Screen, Urine Neg Negative <1000 ng/mL    Barbiturate Screen, Ur Neg Negative < 200 ng/mL    Benzodiazepine Screen, Urine Neg Negative < 200 ng/mL    Cannabinoid Scrn, Ur Neg Negative < 50 ng/mL    Cocaine Metabolite Screen, Urine Neg Negative < 300 ng/mL    Opiate Scrn, Ur Neg Negative < 300 ng/mL    PCP Screen, Urine Neg Negative < 25 ng/mL    Methadone Screen, Urine Neg Negative <300 ng/mL    Propoxyphene Scrn, Ur Neg Negative <300 ng/mL    Oxycodone Urine Neg Negative <100 ng/mL    Drug Screen Comment: see below foreign body - was found unresponsive at home on floor with blood around him, did not require sutures. Plan: monitor    Alcohol use - daily beer drinker (4) quit 1/1/2021  Plan: CIWA protocol with PRN ativan,  when appropriate.       SIGNATURE: ELBERT Younger - CNP  DATE: January 6, 2021  TIME: 9:24 PM   Edith Zazueta MD  - supervising physician

## 2021-01-07 NOTE — PLAN OF CARE
Problem: Falls - Risk of:  Goal: Will remain free from falls  Description: Will remain free from falls  Outcome: Ongoing  Goal: Absence of physical injury  Description: Absence of physical injury  Outcome: Ongoing     Problem: Discharge Planning:  Goal: Discharged to appropriate level of care  Description: Discharged to appropriate level of care  Outcome: Ongoing     Problem: Fluid Volume - Deficit:  Goal: Absence of fluid volume deficit signs and symptoms  Description: Absence of fluid volume deficit signs and symptoms  Outcome: Ongoing     Problem: Nutrition Deficit:  Goal: Ability to achieve adequate nutritional intake will improve  Description: Ability to achieve adequate nutritional intake will improve  Outcome: Ongoing     Problem: Sleep Pattern Disturbance:  Goal: Appears well-rested  Description: Appears well-rested  Outcome: Ongoing     Problem: Violence - Risk of, Self/Other-Directed:  Goal: Knowledge of developmental care interventions  Description: Absence of violence  Outcome: Ongoing     Problem: Coping:  Goal: Ability to cope will improve  Description: Ability to cope will improve  Outcome: Ongoing  Goal: Ability to identify appropriate support needs will improve  Description: Ability to identify appropriate support needs will improve  Outcome: Ongoing     Problem: Health Behavior:  Goal: Ability to manage health-related needs will improve  Description: Ability to manage health-related needs will improve  Outcome: Ongoing     Problem: Physical Regulation:  Goal: Signs of adequate cerebral perfusion will increase  Description: Signs of adequate cerebral perfusion will increase  Outcome: Ongoing  Goal: Ability to maintain a stable neurologic state will improve  Description: Ability to maintain a stable neurologic state will improve  Outcome: Ongoing     Problem: Safety:  Goal: Ability to remain free from injury will improve  Description: Ability to remain free from injury will improve  Outcome: Ongoing     Problem: Self-Concept:  Goal: Level of anxiety will decrease  Description: Level of anxiety will decrease  Outcome: Ongoing  Goal: Ability to verbalize feelings about condition will improve  Description: Ability to verbalize feelings about condition will improve  Outcome: Ongoing

## 2021-01-07 NOTE — ED PROVIDER NOTES
Note was not shared by Dr. Saint Grieves. Please see her note for patient evaluation. Head CT was normal.  Sodium was found to be 112 today. This is clearly causing his behavioral change. I spoke to Dr. Brenda Bailey who advised 3% concentrated saline at 40 cc/h with standard 2-hour blood draws.      Darylene Leech, MD  01/06/21 3879

## 2021-01-07 NOTE — PROGRESS NOTES
Assumed care of patient labs sent for Q 2 hr sodium , pt ciwa score 22 pt had visible  tremors noted pt restless in bed ativan 4mg given. Pt 1:1 for safety. Pt agitated unable to follow simple commands  Restarted 3% sodium at 40 cc/hr will recheck sodium   0300 call to lag regarding sodium level  .  0310 call to DR. [place] no call back  0620 lab called critical sodium 120 on pt .  0635 call to Dr. Zeus Brasher  Regarding critical sodium 120 stoppd 3% sodium  [id number] no call back from DR. Sandip Kaminski called with sodium of 120   0700 DR. britt in with pt

## 2021-01-07 NOTE — PROGRESS NOTES
Spiritual Care Services     Summary of Visit:      Spiritual Assessment/Intervention/Outcomes:    Encounter Summary  Services provided to[de-identified] Patient  Referral/Consult From[de-identified] Rounding  Support System: Spouse, Family members, Friends/neighbors  Continue Visiting: No  Complexity of Encounter: Moderate  Length of Encounter: 15 minutes  Spiritual Assessment Completed: Yes  Routine  Type: Initial  Assessment: Calm, Approachable, Sleeping  Intervention: [place], Sustaining presence/ Ministry of presence  Outcome: Receptive              Advance Directives (For Healthcare)  Pre-existing DNR Comfort Care/DNR Arrest/DNI Order: No  Healthcare Directive: No, patient does not have an advance directive for healthcare treatment           Values / Beliefs  Do you have any ethnic, cultural, sacramental, or spiritual Congregation needs you would like us to be aware of while you are in the hospital?: No    Care Plan:        [id number] [address]ickson   Electronically signed by Reyes Eldridge on 1/7/21 at 3:10 PM EST     To reach a  for emotional and spiritual support, place an [facility] hospitals consult request.   If a  is needed immediately, dial 0 and ask to page the on-call .

## 2021-01-07 NOTE — CONSULTS
Pulmonary and Critical Care Medicine  Consult Note  Encounter Date: 2021 11:21 AM    Mr. Tonya Armando is a 38 y.o. male  : 1985  Requesting Provider: Crys Iqbal DO    Reason for request: DT/hyponatremia            HISTORY OF PRESENT ILLNESS:    Patient is 38 y.o. presents with altered mental status, he was found on the floor by his wife, he cannot provide any history, history was obtained from his wife at bedside. He has history of heavy alcohol drinking, he quit alcohol starting , he has been having symptoms of shakiness and feeling unwell for the last few days, today on her arrival to house she found him laying on the floor she did not witness any seizure but she is not sure if he had any at her absence. He did have a temp of 100.8, there is suspected vomiting and aspiration, he was on 3% overnight. No bowel movement, no vomiting here he is currently on Precedex and sedated, his CIWA score last was 24 blood sugar is okay. There is a reported fall and trauma with abrasion to the back of the head          Past Medical History:    History reviewed. No pertinent past medical history. Alcoholism  Past Surgical History:    No past surgical history on file. Social History:       Alcoholism  Family History:   No family history on file. Not obtainable from the patient  Allergies:  Patient has no known allergies.         MEDICATIONS during current hospitalization:    Continuous Infusions:   dexmedetomidine 1.3 mcg/kg/hr (21 1010)    [Held by provider] sodium chloride      sodium chloride 50 mL/hr at 21 1043       Scheduled Meds:   IVPB builder   Intravenous Daily    folic acid IVPB  1 mg Intravenous Daily    ampicillin-sulbactam  1.5 g Intravenous Q6H    chlordiazePOXIDE  10 mg Oral TID    sodium chloride flush  10 mL Intravenous 2 times per day    multivitamin  1 tablet Oral Daily       PRN Meds:sodium chloride flush, promethazine **OR** ondansetron, polyethylene glycol, acetaminophen **OR** acetaminophen, LORazepam **OR** LORazepam **OR** LORazepam **OR** LORazepam **OR** LORazepam **OR** LORazepam **OR** LORazepam **OR** LORazepam        REVIEW OF SYSTEMS: Not obtainable due to patient current mental status  PHYSICAL EXAM:    Vitals:  BP (!) 96/59   Pulse 71   Temp 99.7 °F (37.6 °C) (Axillary)   Resp 16   Ht 6' 1\" (1.854 m)   Wt 188 lb 7.9 oz (85.5 kg)   SpO2 98%   BMI 24.87 kg/m²     General: Encephalopathic, does not answer question or interact, eyes are closed  Head: normocephalic, abrasion at the back of the head  Eyes:No gross abnormalities. ENT:  MMM no lesions  Neck:  supple and no masses  Chest : clear to auscultation bilaterally- no wheezes, rales or rhonchi, normal air movement, no respiratory distress  Heart[de-identified] Heart sounds are normal.  Regular rate and rhythm without murmur, gallop or rub. ABD:  symmetric, soft, non-tender, no guarding or rebound  Musculoskeletal : no cyanosis, no clubbing and no edema  Neuro: When he wakes up he is agitated and moving all 4 extremities  Skin: No rashes or nodules noted. Lymph node:  no cervical nodes  Urology: No Morris   Psychiatric: Currently calm        Data Review  Recent Labs     01/06/21  1730 01/07/21  0455   WBC 7.4 7.6   HGB 14.2 13.5*   HCT 40.5* 38.4*    121*      Recent Labs     01/06/21  1937 01/06/21  1937 01/07/21  0207 01/07/21  0455 01/07/21  0857   *   < > 113* 120*  118* 123*   K 3.5  --   --  3.9  --    CL 80*  --   --  88*  --    CO2 24  --   --  22  --    BUN 4*  --   --  4*  --    CREATININE 0.58*  --   --  0.68*  --    GLUCOSE 110*  --   --  112*  --     < > = values in this interval not displayed. MV Settings: ABGs: No results for input(s): PHART, NKE0TBV, PO2ART, EMU0DQZ, BEART, H0WABLKE, ELX8VXU in the last 72 hours.   O2 Device: None (Room air)  O2 Flow Rate (L/min): 0 L/min  Lab Results   Component Value Date    LACTA 1.9 01/06/2021       Radiology  I personally reviewed imaging studies and he is infiltrate left lower lobe        Assessment, plan: This is a critically ill patient at risk of deterioration / death , needing close ICU monitoring and intervention due to below noted problems     · Severe DT with acute encephalopathy  · Possible alcohol withdrawal seizure  · Possible aspiration pneumonia left lower lobe  · Alcoholism  · Hyponatremia secondary to above  · Fever could be due to DT versus pneumonia  Recommendation  · Continue Precedex drip  · Start Librium via NG tube  · Start trophic tube feed  · Monitor CIWA with as needed Ativan  · Fluid management per nephrology  · Monitor blood sugar  · Start Unasyn  · Repeat procalcitonin tomorrow    Discussed with Dr. Annalise Yang      Due to the immediate potential for life-threatening deterioration due to acute encephalopathy and DT, I spent 35 minutes providing critical care. This time is excluding time spent performing procedures.       Thank you for consultation    Electronically signed by Cece Constantino MD, [facility],  on 1/7/2021 at 11:21 AM

## 2021-01-07 NOTE — CONSULTS
[facility]  Consult Note           Reason for Consult:  hyponatremia  Requesting Physician:  Dr. Rain Arellano     Chief Complaint:  AMS  History Obtained From:  electronic medical record    History of Present Ilness:    38y.o. year old male with history s/f EtoH use who presented with AMS. Found on floor by his wife unresponsive surrounded by blood w/ ? Of seizure activity. Drinks EtOH daily. Last drink was 01/01/21. P/w Na 112, received 3% NS, Na up to 123 this AM. Currently on NS     Past Medical History:    History reviewed. No pertinent past medical history. Past Surgical History:    No past surgical history on file. Home Medications:    No current facility-administered medications on file prior to encounter. No current outpatient medications on file prior to encounter. Allergies:  Patient has no known allergies.     Social History:    Social History     Socioeconomic History    Marital status:      Spouse name: Not on file    Number of children: Not on file    Years of education: Not on file    Highest education level: Not on file   Occupational History    Not on file   Social Needs    Financial resource strain: Not on file    Food insecurity     Worry: Not on file     Inability: Not on file    Transportation needs     Medical: Not on file     Non-medical: Not on file   Tobacco Use    Smoking status: Not on file   Substance and Sexual Activity    Alcohol use: Not on file    Drug use: Not on file    Sexual activity: Not on file   Lifestyle    Physical activity     Days per week: Not on file     Minutes per session: Not on file    Stress: Not on file   Relationships    Social connections     Talks on phone: Not on file     Gets together: Not on file     Attends Jehovah's witness service: Not on file     Active member of club or organization: Not on file     Attends meetings of clubs or organizations: Not on file     Relationship status: Not on file    Intimate partner violence Fear of current or ex partner: Not on file     Emotionally abused: Not on file     Physically abused: Not on file     Forced sexual activity: Not on file   Other Topics Concern    Not on file   Social History Narrative    Not on file       Family History:   No family history on file. Review of Systems:   Unable to obtain due to altered mental status      Physical exam:   Constitutional:  VITALS:  BP (!) 96/59   Pulse 71   Temp 99.7 °F (37.6 °C) (Axillary)   Resp 16   Ht 6' 1\" (1.854 m)   Wt 188 lb 7.9 oz (85.5 kg)   SpO2 98%   BMI 24.87 kg/m²     General: poorly responsive, in no apparent distress  HEENT: normocephalic, atraumatic, anicteric  Neck: supple, no mass  Lungs: non-labored respirations, clear to auscultation bilaterally  Heart: regular rate and rhythm, no murmurs or rubs  Abdomen: soft, non-tender, non-distended  MSK: no joint swelling or tenderness  Ext: no cyanosis, no peripheral edema  Neuro: poorly responsive  Psych: unable to assess    Data/  CBC:   Lab Results   Component Value Date    WBC 7.6 01/07/2021    RBC 4.27 01/07/2021    HGB 13.5 01/07/2021    HCT 38.4 01/07/2021    MCV 90.0 01/07/2021    MCH 31.6 01/07/2021    MCHC 35.2 01/07/2021    RDW 13.3 01/07/2021     01/07/2021     BMP:    Lab Results   Component Value Date     01/07/2021    K 3.9 01/07/2021    CL 88 01/07/2021    CO2 22 01/07/2021    BUN 4 01/07/2021    LABALBU 3.9 01/07/2021    CREATININE 0.68 01/07/2021    CALCIUM 8.3 01/07/2021    GFRAA >60.0 01/07/2021    LABGLOM >60.0 01/07/2021    GLUCOSE 112 01/07/2021     Ct Head Wo Contrast    Result Date: 1/7/2021  CT HEAD WO CONTRAST : 1/6/2021 CLINICAL HISTORY:  ams . COMPARISON: None available. TECHNIQUE: Spiral unenhanced images were obtained of the head, with routine multiplanar reconstructions performed.  All CT scans at this facility use dose modulation, iterative reconstruction, and/or weight based dosing when appropriate to reduce radiation dose to as

## 2021-01-07 NOTE — PROGRESS NOTES
7019- received report from night shift nurse. Patient has a sitter at bedside for safety and has required the assistance of multiple nurses throughout the night when the patient became rowdy. Bedside rails padded for seizure precautions. 0800- spoke with patient's wife [facility] , verified Gina code. Gave updates about patient condition. She will be in to visit during visiting hours. 200- spoke with lab about phlebotomist coming to draw 0900 sodium lab. Phlebotomist is said to be on the way to ICU now. 0900- patient's wife at bedside. Updates given about patient condition,     4707- multidisciplinary team at bedside including Dr. Ana Cristina Montes De Oca - labs reviewed and patient condition reviewed - during corpak placement with Dr. Ana Cristina Montes De Oca patient was very agitated, and required the assistance of 6 employees to hold patient down in the bed to prevent him from harming himself, bilateral wrist restraints were applied, precedex gtt had to be increased, additional doses of IV ativan had to be administered. Xray confirmed placement of corpak. Ready to use per Dr. Ana Cristina Montes De Oca. 1230- patient is now calm, sitter remains at bedside, bilateral wrist restraints remain in place. 1440- patient has had 4950 ml urine output since 0700 today. Urine is now colorless. Tube feed started at 20 ml/hr, free water flush 50 ml.  Updates sent to Dr. Donaldson July on perfect serve

## 2021-01-07 NOTE — PROGRESS NOTES
Physician Progress Note      PATIENTAudrey Navarro  CSN #:                  [id number]  :                       1985  ADMIT DATE:       2021 4:53 PM  DISCH DATE:  RESPONDING  PROVIDER #:        Kamran SAMANIEGO DO          QUERY TEXT:    Dear Attending:    Pt admitted with AMS, hyponatremia and  has alcohol abuse withdrawal. Please   document any correlating medical diagnosis in the medical record: The medical record reflects the following:  Risk Factors:  history of alcohol abuse and stopped drinking   Clinical Indicators: Na+ 112  120   UR osmolality 135  eyes open but patient   with blank stare. Extremely agitated. Tremulous cannot stay still. Constantly picking at his arms. Not speaking. Treatment: [facility] nephrology consult  hypertonic saline  pertinent labs    Thank you,  Donal KIMBLEN RN CDS  contact [facility]  w/ any questions or  [email or web address]  Options provided:  -- alcohol dependence with withdrawal delirium  -- alcoholic encephalopathy  -- Other - I will add my own diagnosis  -- Disagree - Not applicable / Not valid  -- Disagree - Clinically unable to determine / Unknown  -- Refer to Clinical Documentation Reviewer    PROVIDER RESPONSE TEXT:    This patient has alcohol dependence with withdrawal delirium.     Query created by: Valentine Mora on 2021 9:01 AM      Electronically signed by:  Jose Ramon Gaston DO 2021 9:38 AM

## 2021-01-07 NOTE — PROGRESS NOTES
Pt arrived from ED via cart. Pt is agitated and moving around in bed. Bedside report completed. Skin assessment completed with Doreen Eller and Bandar Wei RN. Pt cannot be redirected and turns self into prone position. Pt assessed. Pt does not answer questions. Pt does respond \"ok\" when told he is in the hospital. 1:1 care initiated per order. Report given to [facility].

## 2021-01-07 NOTE — ED PROVIDER NOTES
[facility] ICU  EMERGENCY DEPARTMENT ENCOUNTER      Pt Name: Miguel Ángel Arzola  MRN: [id number]  [place] 1985  Date of evaluation: 1/6/2021  Provider: Baljeet Diaz, [address] [place] [address]       Chief Complaint   Patient presents with    Altered Mental Status         HISTORY OF PRESENT ILLNESS   (Location/Symptom, Timing/Onset, Context/Setting, Quality, Duration, Modifying Factors, Severity)  Note limiting factors. Miguel Ángel Arzola is a 38 y.o. male who presents to the emergency department . Patient was brought in with altered mental status. Wife came home from work to find their 1year-old just playing on its own. There was blood on the floor and when she found the patient he could not really answer questions and was extremely confused and agitated. She believes that he hit his head but he is not able to answer questions. Patient has history of alcohol abuse and stopped drinking January 1. Wife has never seen the patient act like this ever. No drug abuse. HPI    Nursing Notes were reviewed. REVIEW OF SYSTEMS    (2-9 systems for level 4, 10 or more for level 5)     Review of Systems   Constitutional: Negative for activity change, appetite change and fatigue. HENT: Negative for congestion and sore throat. Eyes: Negative for pain and visual disturbance. Respiratory: Negative for chest tightness and shortness of breath. Cardiovascular: Negative for chest pain. Gastrointestinal: Negative for abdominal pain, nausea and vomiting. Endocrine: Negative for polydipsia. Genitourinary: Negative for flank pain and urgency. Musculoskeletal: Negative for gait problem and neck stiffness. Skin: Negative for rash. Neurological: Negative for weakness, light-headedness and headaches. Psychiatric/Behavioral: Positive for agitation and confusion. Negative for sleep disturbance. Except as noted above the remainder of the review of systems was reviewed and negative.        PAST MEDICAL HISTORY   No past medical history on file. SURGICAL HISTORY     No past surgical history on file. CURRENT MEDICATIONS     There are no discharge medications for this patient. ALLERGIES     Patient has no known allergies. FAMILY HISTORY     No family history on file. SOCIAL HISTORY       Social History     Socioeconomic History    Marital status:      Spouse name: Not on file    Number of children: Not on file    Years of education: Not on file    Highest education level: Not on file   Occupational History    Not on file   Social Needs    Financial resource strain: Not on file    Food insecurity     Worry: Not on file     Inability: Not on file    Transportation needs     Medical: Not on file     Non-medical: Not on file   Tobacco Use    Smoking status: Not on file   Substance and Sexual Activity    Alcohol use: Not on file    Drug use: Not on file    Sexual activity: Not on file   Lifestyle    Physical activity     Days per week: Not on file     Minutes per session: Not on file    Stress: Not on file   Relationships    Social connections     Talks on phone: Not on file     Gets together: Not on file     Attends [employer] service: Not on file     Active member of club or organization: Not on file     Attends meetings of clubs or organizations: Not on file     Relationship status: Not on file    Intimate partner violence     Fear of current or ex partner: Not on file     Emotionally abused: Not on file     Physically abused: Not on file     Forced sexual activity: Not on file   Other Topics Concern    Not on file   Social History Narrative    Not on file       SCREENINGS        [place] Coma Scale  Eye Opening: Spontaneous  Best Verbal Response: Incomprehensible speech  Best Motor Response:  Withdraws from pain  [place] Coma Scale Score: 10               PHYSICAL EXAM    (up to 7 for level 4, 8 or more for level 5)     ED Triage Vitals   BP Temp Temp Source Pulse Resp SpO2 Height Weight   01/06/21 1901 01/06/21 1902 01/06/21 1902 01/06/21 1645 01/06/21 1645 01/06/21 1645 01/06/21 1645 01/06/21 1645   (!) 149/84 98.4 °F (36.9 °C) Oral 89 20 98 % 6' 1\" (1.854 m) 175 lb (79.4 kg)       Physical Exam  Vitals signs and nursing note reviewed. Constitutional:       Appearance: He is well-developed. Comments: Eyes open but patient with blank stare. Extremely agitated. Tremulous cannot stay still. Constantly picking at his arms. Not speaking. HENT:      Head: Normocephalic. Comments: Soft tissue swelling left parietal region no active bleeding  Eyes:      Extraocular Movements: Extraocular movements intact. Right eye: No nystagmus. Left eye: No nystagmus. Pupils: Pupils are equal, round, and reactive to light. Comments: No nystagmus   Neck:      Musculoskeletal: Normal range of motion and neck supple. Cardiovascular:      Rate and Rhythm: Normal rate and regular rhythm. Pulmonary:      Effort: Pulmonary effort is normal.      Breath sounds: Normal breath sounds. Abdominal:      General: Bowel sounds are normal.      Palpations: Abdomen is soft. Skin:     General: Skin is warm and dry. Comments: Not jaundice   Neurological:      Mental Status: He is disoriented and confused. GCS: GCS eye subscore is 4. GCS verbal subscore is 1. GCS motor subscore is 3. Cranial Nerves: No cranial nerve deficit. Psychiatric:         Mood and Affect: Mood is anxious. Behavior: Behavior is agitated. Cognition and Memory: Cognition is impaired.          DIAGNOSTIC RESULTS     EKG: All EKG's are interpreted by the Emergency Department Physician who either signs or Co-signs this chart in the absence of a cardiologist.        RADIOLOGY:   Non-plain film images such as CT, Ultrasound and MRI are read by the radiologist. Plain radiographic images are visualized and preliminarily interpreted by the emergency physician with the below findings:    Ct other components within normal limits   OSMOLALITY - Abnormal; Notable for the following components:    Osmolality 246 (*)     All other components within normal limits   CBC WITH AUTO DIFFERENTIAL - Abnormal; Notable for the following components:    RBC 4.27 (*)     Hemoglobin 13.5 (*)     Hematocrit 38.4 (*)     MCH 31.6 (*)     Platelets 348 (*)     Lymphocytes Absolute 0.4 (*)     All other components within normal limits   COMPREHENSIVE METABOLIC PANEL W/ REFLEX TO MG FOR LOW K - Abnormal; Notable for the following components:    Sodium 120 (*)     Chloride 88 (*)     Glucose 112 (*)     BUN 4 (*)     CREATININE 0.68 (*)     Calcium 8.3 (*)     Total Bilirubin 1.1 (*)      (*)     All other components within normal limits    Narrative:     CALL  [facility] tel. [phone],  SODIUM results called to and read back by SUSAN PICKENS, 01/07/2021 06:30, by  REMARLEY   PHOSPHORUS - Abnormal; Notable for the following components:    Phosphorus 2.1 (*)     All other components within normal limits    Narrative:     CALL  [facility] tel. [phone],  SODIUM results called to and read back by SUSAN PICKENS, 01/07/2021 06:16, by  REYAL   SODIUM - Abnormal; Notable for the following components:    Sodium 112 (*)     All other components within normal limits    Narrative:     [facility] Jeremiah tel. [phone],  Sodium results called to and read back by Luiza Pearson, 01/07/2021 00:01, by  REYAL   SODIUM - Abnormal; Notable for the following components:    Sodium 113 (*)     All other components within normal limits    Narrative:     [facility] Jeremiah tel. [phone],  Sodium results called to and read back by Luiza Pearson, 01/07/2021 02:53, by  REYAL   SODIUM - Abnormal; Notable for the following components:    Sodium 118 (*)     All other components within normal limits    Narrative:     [facility] Jerem[facility] tel. [phone],  SODIUM results called to and read back by SUSAN PICKENS, 01/07/2021 06:16, by  REYAL   LACTIC ACID, specified. DISCHARGE MEDICATIONS:  There are no discharge medications for this patient. Controlled Substances Monitoring:     No flowsheet data found.     (Please note that portions of this note were completed with a voice recognition program.  Efforts were made to edit the dictations but occasionally words are mis-transcribed.)    Osito Vasquez DO (electronically signed)  Attending Emergency Physician            Osito Vasquez DO  01/07/21 5880

## 2021-01-08 LAB
ALBUMIN SERPL-MCNC: 3.7 G/DL (ref 3.5–4.6)
ALP BLD-CCNC: 62 U/L (ref 35–104)
ALT SERPL-CCNC: 42 U/L (ref 0–41)
ANION GAP SERPL CALCULATED.3IONS-SCNC: 11 MEQ/L (ref 9–15)
ANION GAP SERPL CALCULATED.3IONS-SCNC: 12 MEQ/L (ref 9–15)
ANION GAP SERPL CALCULATED.3IONS-SCNC: 12 MEQ/L (ref 9–15)
ANION GAP SERPL CALCULATED.3IONS-SCNC: 13 MEQ/L (ref 9–15)
ANION GAP SERPL CALCULATED.3IONS-SCNC: 14 MEQ/L (ref 9–15)
AST SERPL-CCNC: 245 U/L (ref 0–40)
BASOPHILS ABSOLUTE: 0 K/UL (ref 0–0.2)
BASOPHILS RELATIVE PERCENT: 0.2 %
BILIRUB SERPL-MCNC: 0.8 MG/DL (ref 0.2–0.7)
BUN BLDV-MCNC: 5 MG/DL (ref 6–20)
BUN BLDV-MCNC: 7 MG/DL (ref 6–20)
BUN BLDV-MCNC: 7 MG/DL (ref 6–20)
CALCIUM SERPL-MCNC: 8.1 MG/DL (ref 8.5–9.9)
CALCIUM SERPL-MCNC: 8.3 MG/DL (ref 8.5–9.9)
CALCIUM SERPL-MCNC: 8.4 MG/DL (ref 8.5–9.9)
CALCIUM SERPL-MCNC: 8.4 MG/DL (ref 8.5–9.9)
CALCIUM SERPL-MCNC: 8.7 MG/DL (ref 8.5–9.9)
CHLORIDE BLD-SCNC: 90 MEQ/L (ref 95–107)
CHLORIDE BLD-SCNC: 91 MEQ/L (ref 95–107)
CHLORIDE BLD-SCNC: 91 MEQ/L (ref 95–107)
CHLORIDE BLD-SCNC: 92 MEQ/L (ref 95–107)
CHLORIDE BLD-SCNC: 99 MEQ/L (ref 95–107)
CO2: 21 MEQ/L (ref 20–31)
CO2: 23 MEQ/L (ref 20–31)
CO2: 24 MEQ/L (ref 20–31)
CREAT SERPL-MCNC: 0.59 MG/DL (ref 0.7–1.2)
CREAT SERPL-MCNC: 0.65 MG/DL (ref 0.7–1.2)
CREAT SERPL-MCNC: 0.68 MG/DL (ref 0.7–1.2)
CREAT SERPL-MCNC: 0.68 MG/DL (ref 0.7–1.2)
CREAT SERPL-MCNC: 0.7 MG/DL (ref 0.7–1.2)
EOSINOPHILS ABSOLUTE: 0.1 K/UL (ref 0–0.7)
EOSINOPHILS RELATIVE PERCENT: 1.2 %
GFR AFRICAN AMERICAN: >60
GFR NON-AFRICAN AMERICAN: >60
GLOBULIN: 2.6 G/DL (ref 2.3–3.5)
GLUCOSE BLD-MCNC: 102 MG/DL (ref 70–99)
GLUCOSE BLD-MCNC: 104 MG/DL (ref 70–99)
GLUCOSE BLD-MCNC: 127 MG/DL (ref 60–115)
GLUCOSE BLD-MCNC: 132 MG/DL (ref 60–115)
GLUCOSE BLD-MCNC: 135 MG/DL (ref 70–99)
GLUCOSE BLD-MCNC: 83 MG/DL (ref 70–99)
GLUCOSE BLD-MCNC: 88 MG/DL (ref 70–99)
HCT VFR BLD CALC: 42.1 % (ref 42–52)
HEMOGLOBIN: 14.2 G/DL (ref 14–18)
LYMPHOCYTES ABSOLUTE: 0.5 K/UL (ref 1–4.8)
LYMPHOCYTES RELATIVE PERCENT: 9.3 %
MAGNESIUM: 2.1 MG/DL (ref 1.7–2.4)
MCH RBC QN AUTO: 30.9 PG (ref 27–31.3)
MCHC RBC AUTO-ENTMCNC: 33.7 % (ref 33–37)
MCV RBC AUTO: 91.6 FL (ref 80–100)
MONOCYTES ABSOLUTE: 0.6 K/UL (ref 0.2–0.8)
MONOCYTES RELATIVE PERCENT: 10.8 %
NEUTROPHILS ABSOLUTE: 4.6 K/UL (ref 1.4–6.5)
NEUTROPHILS RELATIVE PERCENT: 78.5 %
PDW BLD-RTO: 13.3 % (ref 11.5–14.5)
PERFORMED ON: ABNORMAL
PERFORMED ON: ABNORMAL
PHOSPHORUS: 2.3 MG/DL (ref 2.3–4.8)
PHOSPHORUS: 2.9 MG/DL (ref 2.3–4.8)
PHOSPHORUS: 3.3 MG/DL (ref 2.3–4.8)
PLATELET # BLD: 116 K/UL (ref 130–400)
POTASSIUM REFLEX MAGNESIUM: 3.4 MEQ/L (ref 3.4–4.9)
POTASSIUM SERPL-SCNC: 3.1 MEQ/L (ref 3.4–4.9)
POTASSIUM SERPL-SCNC: 3.2 MEQ/L (ref 3.4–4.9)
POTASSIUM SERPL-SCNC: 3.4 MEQ/L (ref 3.4–4.9)
POTASSIUM SERPL-SCNC: 3.5 MEQ/L (ref 3.4–4.9)
PROCALCITONIN: 0.22 NG/ML (ref 0–0.15)
RBC # BLD: 4.6 M/UL (ref 4.7–6.1)
SODIUM BLD-SCNC: 126 MEQ/L (ref 135–144)
SODIUM BLD-SCNC: 126 MEQ/L (ref 135–144)
SODIUM BLD-SCNC: 127 MEQ/L (ref 135–144)
SODIUM BLD-SCNC: 128 MEQ/L (ref 135–144)
SODIUM BLD-SCNC: 134 MEQ/L (ref 135–144)
TOTAL PROTEIN: 6.3 G/DL (ref 6.3–8)
WBC # BLD: 5.8 K/UL (ref 4.8–10.8)

## 2021-01-08 PROCEDURE — 6370000000 HC RX 637 (ALT 250 FOR IP): Performed by: PSYCHIATRY & NEUROLOGY

## 2021-01-08 PROCEDURE — 80053 COMPREHEN METABOLIC PANEL: CPT

## 2021-01-08 PROCEDURE — 6360000002 HC RX W HCPCS: Performed by: PSYCHIATRY & NEUROLOGY

## 2021-01-08 PROCEDURE — 6370000000 HC RX 637 (ALT 250 FOR IP): Performed by: INTERNAL MEDICINE

## 2021-01-08 PROCEDURE — 1210000000 HC MED SURG R&B

## 2021-01-08 PROCEDURE — 2500000003 HC RX 250 WO HCPCS: Performed by: INTERNAL MEDICINE

## 2021-01-08 PROCEDURE — 6360000002 HC RX W HCPCS: Performed by: INTERNAL MEDICINE

## 2021-01-08 PROCEDURE — 2580000003 HC RX 258: Performed by: INTERNAL MEDICINE

## 2021-01-08 PROCEDURE — 85025 COMPLETE CBC W/AUTO DIFF WBC: CPT

## 2021-01-08 PROCEDURE — 94667 MNPJ CHEST WALL 1ST: CPT

## 2021-01-08 PROCEDURE — 84100 ASSAY OF PHOSPHORUS: CPT

## 2021-01-08 PROCEDURE — 2580000003 HC RX 258: Performed by: NURSE PRACTITIONER

## 2021-01-08 PROCEDURE — 99233 SBSQ HOSP IP/OBS HIGH 50: CPT | Performed by: INTERNAL MEDICINE

## 2021-01-08 PROCEDURE — 2580000003 HC RX 258: Performed by: PSYCHIATRY & NEUROLOGY

## 2021-01-08 PROCEDURE — 2580000003 HC RX 258: Performed by: STUDENT IN AN ORGANIZED HEALTH CARE EDUCATION/TRAINING PROGRAM

## 2021-01-08 PROCEDURE — 36415 COLL VENOUS BLD VENIPUNCTURE: CPT

## 2021-01-08 PROCEDURE — 6370000000 HC RX 637 (ALT 250 FOR IP): Performed by: NURSE PRACTITIONER

## 2021-01-08 PROCEDURE — 84145 PROCALCITONIN (PCT): CPT

## 2021-01-08 PROCEDURE — 6360000002 HC RX W HCPCS: Performed by: NURSE PRACTITIONER

## 2021-01-08 PROCEDURE — 83735 ASSAY OF MAGNESIUM: CPT

## 2021-01-08 PROCEDURE — 99253 IP/OBS CNSLTJ NEW/EST LOW 45: CPT | Performed by: PSYCHIATRY & NEUROLOGY

## 2021-01-08 RX ORDER — DESMOPRESSIN ACETATE 4 UG/ML
5 INJECTION, SOLUTION INTRAVENOUS; SUBCUTANEOUS ONCE
Status: COMPLETED | OUTPATIENT
Start: 2021-01-08 | End: 2021-01-09

## 2021-01-08 RX ORDER — LABETALOL HYDROCHLORIDE 5 MG/ML
10 INJECTION, SOLUTION INTRAVENOUS ONCE
Status: COMPLETED | OUTPATIENT
Start: 2021-01-08 | End: 2021-01-08

## 2021-01-08 RX ORDER — PROPRANOLOL HYDROCHLORIDE 20 MG/1
20 TABLET ORAL 2 TIMES DAILY
Status: DISCONTINUED | OUTPATIENT
Start: 2021-01-08 | End: 2021-01-11 | Stop reason: HOSPADM

## 2021-01-08 RX ORDER — POTASSIUM CHLORIDE 20 MEQ/1
40 TABLET, EXTENDED RELEASE ORAL ONCE
Status: COMPLETED | OUTPATIENT
Start: 2021-01-08 | End: 2021-01-09

## 2021-01-08 RX ORDER — DESMOPRESSIN ACETATE 4 UG/ML
4 INJECTION, SOLUTION INTRAVENOUS; SUBCUTANEOUS ONCE
Status: DISCONTINUED | OUTPATIENT
Start: 2021-01-08 | End: 2021-01-08

## 2021-01-08 RX ORDER — LANOLIN ALCOHOL/MO/W.PET/CERES
100 CREAM (GRAM) TOPICAL DAILY
Status: DISCONTINUED | OUTPATIENT
Start: 2021-01-08 | End: 2021-01-11 | Stop reason: HOSPADM

## 2021-01-08 RX ORDER — FOLIC ACID 1 MG/1
1 TABLET ORAL DAILY
Status: DISCONTINUED | OUTPATIENT
Start: 2021-01-08 | End: 2021-01-11 | Stop reason: HOSPADM

## 2021-01-08 RX ORDER — POTASSIUM CHLORIDE 7.45 MG/ML
10 INJECTION INTRAVENOUS ONCE
Status: COMPLETED | OUTPATIENT
Start: 2021-01-08 | End: 2021-01-09

## 2021-01-08 RX ORDER — GUAIFENESIN 600 MG/1
600 TABLET, EXTENDED RELEASE ORAL 2 TIMES DAILY
Status: DISCONTINUED | OUTPATIENT
Start: 2021-01-08 | End: 2021-01-11 | Stop reason: HOSPADM

## 2021-01-08 RX ORDER — DESMOPRESSIN ACETATE 4 UG/ML
4 INJECTION, SOLUTION INTRAVENOUS; SUBCUTANEOUS ONCE
Status: COMPLETED | OUTPATIENT
Start: 2021-01-08 | End: 2021-01-08

## 2021-01-08 RX ORDER — LABETALOL HYDROCHLORIDE 5 MG/ML
10 INJECTION, SOLUTION INTRAVENOUS EVERY 4 HOURS PRN
Status: DISCONTINUED | OUTPATIENT
Start: 2021-01-08 | End: 2021-01-11 | Stop reason: HOSPADM

## 2021-01-08 RX ORDER — LORAZEPAM 2 MG/ML
2 INJECTION INTRAMUSCULAR ONCE
Status: COMPLETED | OUTPATIENT
Start: 2021-01-08 | End: 2021-01-08

## 2021-01-08 RX ORDER — VASOPRESSIN 20 U/ML
5 INJECTION PARENTERAL ONCE
Status: DISCONTINUED | OUTPATIENT
Start: 2021-01-08 | End: 2021-01-08

## 2021-01-08 RX ORDER — LABETALOL HYDROCHLORIDE 5 MG/ML
20 INJECTION, SOLUTION INTRAVENOUS EVERY 4 HOURS PRN
Status: DISCONTINUED | OUTPATIENT
Start: 2021-01-08 | End: 2021-01-11 | Stop reason: HOSPADM

## 2021-01-08 RX ADMIN — LORAZEPAM 1 MG: 2 INJECTION INTRAMUSCULAR; INTRAVENOUS at 05:54

## 2021-01-08 RX ADMIN — DEXTROSE MONOHYDRATE: 50 INJECTION, SOLUTION INTRAVENOUS at 10:20

## 2021-01-08 RX ADMIN — ACETAMINOPHEN 650 MG: 325 TABLET ORAL at 13:14

## 2021-01-08 RX ADMIN — LORAZEPAM 2 MG: 2 INJECTION INTRAMUSCULAR; INTRAVENOUS at 02:07

## 2021-01-08 RX ADMIN — CHLORDIAZEPOXIDE HYDROCHLORIDE 20 MG: 10 CAPSULE ORAL at 10:22

## 2021-01-08 RX ADMIN — DEXTROSE MONOHYDRATE: 50 INJECTION, SOLUTION INTRAVENOUS at 18:12

## 2021-01-08 RX ADMIN — Medication 100 MG: at 11:39

## 2021-01-08 RX ADMIN — SODIUM CHLORIDE 1.5 G: 900 INJECTION INTRAVENOUS at 10:21

## 2021-01-08 RX ADMIN — SODIUM CHLORIDE 1.5 G: 900 INJECTION INTRAVENOUS at 05:02

## 2021-01-08 RX ADMIN — LEVETIRACETAM 500 MG: 100 INJECTION, SOLUTION INTRAVENOUS at 16:43

## 2021-01-08 RX ADMIN — SODIUM CHLORIDE 0.2 MCG/KG/HR: 9 INJECTION, SOLUTION INTRAVENOUS at 09:59

## 2021-01-08 RX ADMIN — SODIUM CHLORIDE 1.4 MCG/KG/HR: 9 INJECTION, SOLUTION INTRAVENOUS at 01:16

## 2021-01-08 RX ADMIN — Medication 10 ML: at 10:23

## 2021-01-08 RX ADMIN — LABETALOL HYDROCHLORIDE 10 MG: 5 INJECTION, SOLUTION INTRAVENOUS at 14:11

## 2021-01-08 RX ADMIN — LORAZEPAM 2 MG: 2 INJECTION INTRAMUSCULAR; INTRAVENOUS at 13:14

## 2021-01-08 RX ADMIN — DESMOPRESSIN ACETATE 4 MCG: 4 SOLUTION INTRAVENOUS at 07:36

## 2021-01-08 RX ADMIN — THERA TABS 1 TABLET: TAB at 10:22

## 2021-01-08 RX ADMIN — FOLIC ACID 1 MG: 1 TABLET ORAL at 11:39

## 2021-01-08 RX ADMIN — GUAIFENESIN 600 MG: 600 TABLET ORAL at 10:22

## 2021-01-08 RX ADMIN — CHLORDIAZEPOXIDE HYDROCHLORIDE 20 MG: 10 CAPSULE ORAL at 14:02

## 2021-01-08 RX ADMIN — PROPRANOLOL HYDROCHLORIDE 20 MG: 20 TABLET ORAL at 14:11

## 2021-01-08 RX ADMIN — SODIUM CHLORIDE 1.4 MCG/KG/HR: 9 INJECTION, SOLUTION INTRAVENOUS at 05:00

## 2021-01-08 RX ADMIN — ENOXAPARIN SODIUM 40 MG: 40 INJECTION, SOLUTION INTRAVENOUS; SUBCUTANEOUS at 10:20

## 2021-01-08 RX ADMIN — SODIUM CHLORIDE 1.5 G: 900 INJECTION INTRAVENOUS at 16:42

## 2021-01-08 RX ADMIN — DEXTROSE MONOHYDRATE: 50 INJECTION, SOLUTION INTRAVENOUS at 06:50

## 2021-01-08 ASSESSMENT — ENCOUNTER SYMPTOMS
CHOKING: 0
NAUSEA: 0
SHORTNESS OF BREATH: 0
PHOTOPHOBIA: 0
VOMITING: 0
BACK PAIN: 0
TROUBLE SWALLOWING: 0
COLOR CHANGE: 0

## 2021-01-08 ASSESSMENT — PAIN SCALES - GENERAL: PAINLEVEL_OUTOF10: 0

## 2021-01-08 NOTE — CONSULTS
Inability: Not on file    Transportation needs     Medical: Not on file     Non-medical: Not on file   Tobacco Use    Smoking status: Never Smoker    Smokeless tobacco: Never Used   Substance and Sexual Activity    Alcohol use: Yes     Frequency: 4 or more times a week     Drinks per session: 5 or 6     Binge frequency: Daily or almost daily    Drug use: Never    Sexual activity: Not on file   Lifestyle    Physical activity     Days per week: Not on file     Minutes per session: Not on file    Stress: Not on file   Relationships    Social connections     Talks on phone: Not on file     Gets together: Not on file     Attends [employer] service: Not on file     Active member of club or organization: Not on file     Attends meetings of clubs or organizations: Not on file     Relationship status: Not on file    Intimate partner violence     Fear of current or ex partner: Not on file     Emotionally abused: Not on file     Physically abused: Not on file     Forced sexual activity: Not on file   Other Topics Concern    Not on file   Social History Narrative    Not on file     No family history on file.   No Known Allergies  Current Facility-Administered Medications   Medication Dose Route Frequency Provider Last Rate Last Admin    guaiFENesin (MUCINEX) extended release tablet 600 mg  600 mg Oral BID Selestino Belen Sedar, DO   600 mg at 01/08/21 1022    enoxaparin (LOVENOX) injection 40 mg  40 mg Subcutaneous Daily Voncille Dipesh SANTANA Sedar, DO   40 mg at 01/08/21 1020    thiamine tablet 100 mg  100 mg Oral Daily Chepe SANTANA Sedar, DO   100 mg at 17/51/39 6389    folic acid (FOLVITE) tablet 1 mg  1 mg Oral Daily Chepe SANTANA Sedar, DO   1 mg at 01/08/21 1139    propranolol (INDERAL) tablet 20 mg  20 mg Oral BID Narda Gonzalez MD   20 mg at 01/08/21 1411    dexmedetomidine (PRECEDEX) 400 mcg in sodium chloride 0.9 % 100 mL infusion  0.2 mcg/kg/hr Intravenous Continuous Naeem Haywood MD   Stopped at 01/08/21 1033    ampicillin-sulbactam (UNASYN) 1.5 g IVPB minibag  1.5 g Intravenous Q6H Chepe ESTHER Sedar, DO   Stopped at 01/08/21 1106    chlordiazePOXIDE (LIBRIUM) capsule 20 mg  20 mg Oral TID Alisha Goodman MD   20 mg at 01/08/21 1402    dextrose 5 % solution   Intravenous Continuous Olive Mao  mL/hr at 01/08/21 1022 Rate Change at 01/08/21 1022    sodium chloride flush 0.9 % injection 10 mL  10 mL Intravenous 2 times per day Lavada Plan, APRN - CNP   10 mL at 01/08/21 1023    sodium chloride flush 0.9 % injection 10 mL  10 mL Intravenous PRN Lavada Plan, APRN - CNP        promethazine (PHENERGAN) tablet 12.5 mg  12.5 mg Oral Q6H PRN Lavada Plan, APRN - CNP        Or    [facility]ron [facility] injection 4 mg  4 mg Intravenous Q6H PRN Lavada Plan, APRN - CNP        polyethylene glycol (GLYCOLAX) packet 17 g  17 g Oral Daily PRN Lavada Plan, APRN - CNP        acetaminophen (TYLENOL) tablet 650 mg  650 mg Oral Q6H PRN Lavada Plan, APRN - CNP   650 mg at 01/08/21 1314    Or    acetaminophen (TYLENOL) suppository 650 mg  650 mg Rectal Q6H PRN Lavada Plan, APRN - CNP        multivitamin 1 tablet  1 tablet Oral Daily Lavada Plan, APRN - CNP   1 tablet at 01/08/21 1022    LORazepam (ATIVAN) tablet 1 mg  1 mg Oral Q1H PRN Lavada Plan, APRN - CNP        Or    LORazepam (ATIVAN) injection 1 mg  1 mg Intravenous Q1H PRN Lavada Plan, APRN - CNP   1 mg at 01/08/21 0304    Or    LORazepam (ATIVAN) tablet 2 mg  2 mg Oral Q1H PRN Lavada Plan, APRN - CNP        Or    LORazepam (ATIVAN) injection 2 mg  2 mg Intravenous Q1H PRN Lavada Plan, APRN - CNP   2 mg at 01/08/21 0207    Or    LORazepam (ATIVAN) tablet 3 mg  3 mg Oral Q1H PRN Lavada Plan, APRN - CNP        Or    LORazepam (ATIVAN) injection 3 mg  3 mg Intravenous Q1H PRN Lavada Plan, APRN - CNP        Or    LORazepam (ATIVAN) tablet 4 mg  4 mg Oral Q1H PRN Eliazar Maker, APRN - CNP        Or    LORazepam (ATIVAN) injection 4 mg  4 mg Intravenous Q1H PRN Eliazar Maker, APRN - CNP   4 mg at 01/07/21 1742        Objective:   BP (!) 188/100   Pulse 125   Temp 98.4 °F (36.9 °C) (Oral)   Resp 19   Ht 6' 1\" (1.854 m)   Wt 188 lb 7.9 oz (85.5 kg)   SpO2 100%   BMI 24.87 kg/m²     Physical Exam  Vitals signs reviewed. Eyes:      Pupils: Pupils are equal, round, and reactive to light. Neck:      Musculoskeletal: Normal range of motion. Cardiovascular:      Rate and Rhythm: Normal rate and regular rhythm. Heart sounds: No murmur. Pulmonary:      Effort: Pulmonary effort is normal.      Breath sounds: Normal breath sounds. Abdominal:      General: Bowel sounds are normal.   Musculoskeletal: Normal range of motion. Skin:     General: Skin is warm. Neurological:      Mental Status: He is alert and oriented to person, place, and time. Cranial Nerves: No cranial nerve deficit. Sensory: No sensory deficit. Motor: No abnormal muscle tone. Coordination: Coordination normal.      Deep Tendon Reflexes: Reflexes are normal and symmetric. Babinski sign absent on the right side. Babinski sign absent on the left side. Psychiatric:         Mood and Affect: Mood normal.     Patient has mild tremors. Ct Head Wo Contrast    Result Date: 1/7/2021  CT HEAD WO CONTRAST : 1/6/2021 CLINICAL HISTORY:  ams . COMPARISON: None available. TECHNIQUE: Spiral unenhanced images were obtained of the head, with routine multiplanar reconstructions performed. All CT scans at this facility use dose modulation, iterative reconstruction, and/or weight based dosing when appropriate to reduce radiation dose to as low as reasonably achievable.  FINDINGS: There is no intracranial hemorrhage, mass effect, midline shift, extra-axial collection, evidence of hydrocephalus, recent ischemic infarct, or skull fracture identified. There is no significant atrophy or white matter changes, for age. The mastoid air cells and visualized paranasal sinuses are essentially clear. NO ACUTE INTRACRANIAL PROCESS IDENTIFIED. Ct Cervical Spine Wo Contrast    Result Date: 1/7/2021  CT CERVICAL SPINE WO CONTRAST CLINICAL HISTORY:  trauma COMPARISON: NONE Findings: Multiple serial axial images of the cervical spine from the base of the skull through the upper thoracic vertebra with both sagittal and coronal reconstructions was performed. There is straightening of the normal expected cervical lordosis. There is multilevel degenerative joint disease. Prevertebral  soft tissues are  unremarkable. The disk spaces are intact No acute fractures or spondylo-listhesis. .     NO ACUTE FRACTURES. All CT scans at this facility use dose modulation, iterative reconstruction, and/or weight based dosing when appropriate to reduce radiation dose to as low as reasonably achievable. Xr Chest Portable    Result Date: 1/7/2021  EXAMINATION: CHEST PORTABLE VIEW  CLINICAL HISTORY: Confusion COMPARISONS: None  FINDINGS: Single  views of the chest is submitted. Limited exam due to low lung volume. The cardiac silhouette is enlarged Pulmonary vascular unremarkable. Right sided trachea. Infiltrate consolidation left lower lobe with trace left pleural effusion. No Pneumothoraces. INFILTRATE CONSOLIDATION LEFT LOWER LOBE WITH TRACE LEFT PLEURAL EFFUSION    Xr Chest Portable    Result Date: 1/7/2021  EXAMINATION: CHEST PORTABLE VIEW  CLINICAL HISTORY: NG tube placement COMPARISONS: January 7, 2021 0910 hours  FINDINGS: A total of 3 supine portable films are submitted. On the final third image the nasogastric tube now lies within the stomach. .  The cardiac silhouette is borderline enlarged. Pulmonary vascular unremarkable. Right sided trachea.  Small area of atelectasis, patchy infiltrate left lower lobe. No Pneumothoraces. ON THE THIRD IMAGE NASOGASTRIC TUBE IS WITHIN THE STOMACH. SMALL AREA OF ATELECTASIS, PATCHY INFILTRATE LEFT LOWER LOBE       Lab Results   Component Value Date    WBC 5.8 01/08/2021    RBC 4.60 01/08/2021    HGB 14.2 01/08/2021    HCT 42.1 01/08/2021    MCV 91.6 01/08/2021    MCH 30.9 01/08/2021    MCHC 33.7 01/08/2021    RDW 13.3 01/08/2021     01/08/2021     Lab Results   Component Value Date     01/08/2021    K 3.5 01/08/2021    K 3.4 01/08/2021    CL 92 01/08/2021    CO2 23 01/08/2021    BUN 5 01/08/2021    CREATININE 0.68 01/08/2021    GFRAA >60.0 01/08/2021    LABGLOM >60.0 01/08/2021    GLUCOSE 83 01/08/2021    PROT 6.3 01/08/2021    LABALBU 3.7 01/08/2021    CALCIUM 8.7 01/08/2021    BILITOT 0.8 01/08/2021    ALKPHOS 62 01/08/2021     01/08/2021    ALT 42 01/08/2021     No results found for: PROTIME, INR  No results found for: TSH, CAQIOHOW05, FOLATE, FERRITIN, IRON, TIBC, PTRFSAT, TSH, FREET4  No results found for: TRIG, HDL, LDLCALC, LDLDIRECT, LABVLDL  Lab Results   Component Value Date    LABAMPH Neg 01/06/2021    BARBSCNU Neg 01/06/2021    LABBENZ Neg 01/06/2021    LABMETH Neg 01/06/2021    OPIATESCREENURINE Neg 01/06/2021    PHENCYCLIDINESCREENURINE Neg 01/06/2021    ETOH <10 01/06/2021     No results found for: LITHIUM, DILFRTOT, VALPROATE    Assessment:   Probable generalized seizure secondary to alcohol withdrawal.  The patient is an active withdrawal and delirium. Patient is tremulous on top. Patient should be covered with Keppra for a few days.   Patient should be on [facility] protocol recommended propranolol for tachycardia as this is part of the withdrawal.  CT scan of the head is normal  Keep an eye on him and  Following him      Plan:

## 2021-01-08 NOTE — PROGRESS NOTES
Pulmonary & Critical Care Medicine ICU Progress Note  Chief complaint : DT    Subjunctive/24 hour events :   Patient seen and examined during multidisciplinary rounds with RN, charge nurse, RT, pharmacy, dietitian, and social service. Patient is awake, he is brushing his teeth, he is interactive, still on Precedex drip, no vomiting, last CIWA score is 12, urine output is good, no bowel movement, no chest pain, no nausea no vomiting      Social History     Tobacco Use    Smoking status: Never Smoker    Smokeless tobacco: Never Used   Substance Use Topics    Alcohol use: Yes     Frequency: 4 or more times a week     Drinks per session: 5 or 6     Binge frequency: Daily or almost daily     No family history on file. No results for input(s): PHART, MEM7TPN, PO2ART in the last 72 hours. MV Settings:     / / /            IV:   dexmedetomidine Stopped (01/08/21 1033)    [Held by provider] sodium chloride Stopped (01/07/21 1615)    dextrose 150 mL/hr at 01/08/21 1022       Vitals:  /85   Pulse 91   Temp 98 °F (36.7 °C) (Oral)   Resp 19   Ht 6' 1\" (1.854 m)   Wt 188 lb 7.9 oz (85.5 kg)   SpO2 100%   BMI 24.87 kg/m²    Tmax:        Intake/Output Summary (Last 24 hours) at 1/8/2021 1112  Last data filed at 1/8/2021 1045  Gross per 24 hour   Intake 4810 ml   Output 4570 ml   Net 240 ml       EXAM:  General: alert, cooperative, no distress  Head: normocephalic, atraumatic  Eyes:No gross abnormalities. ENT:  MMM no lesions  Neck:  supple and no masses  Chest : clear to auscultation bilaterally- no wheezes, rales or rhonchi, normal air movement, no respiratory distress  Heart[de-identified] Heart sounds are normal.  Regular rate and rhythm without murmur, gallop or rub. ABD:  symmetric, soft, non-tender  Musculoskeletal : no cyanosis, no clubbing and no edema  Neuro:  Grossly normal  Skin: No rashes or nodules noted.   Lymph node:  no cervical nodes  Urology: No Morris   Psychiatric: appropriate    Medications:  Scheduled Meds:   guaiFENesin  600 mg Oral BID    enoxaparin  40 mg Subcutaneous Daily    thiamine  100 mg Oral Daily    folic acid  1 mg Oral Daily    ampicillin-sulbactam  1.5 g Intravenous Q6H    chlordiazePOXIDE  20 mg Oral TID    sodium chloride flush  10 mL Intravenous 2 times per day    multivitamin  1 tablet Oral Daily       PRN Meds:  sodium chloride flush, promethazine **OR** ondansetron, polyethylene glycol, acetaminophen **OR** acetaminophen, LORazepam **OR** LORazepam **OR** LORazepam **OR** LORazepam **OR** LORazepam **OR** LORazepam **OR** LORazepam **OR** LORazepam    Results: reviewed by me   CBC:   Recent Labs     01/06/21  1730 01/07/21  0455 01/08/21  0338   WBC 7.4 7.6 5.8   HGB 14.2 13.5* 14.2   HCT 40.5* 38.4* 42.1   MCV 88.2 90.0 91.6    121* 116*     BMP:   Recent Labs     01/07/21  1514 01/07/21  1514 01/07/21  2255 01/08/21  0338 01/08/21  1009   *   < > 132* 134* 127*   K 3.6   < > 3.6 3.4 3.5   CL 99   < > 99 99 92*   CO2 19*   < > 21 21 23   PHOS 3.0  --  2.9 2.9  --    BUN 5*   < > 6 5* 5*   CREATININE 0.71   < > 0.66* 0.65* 0.68*    < > = values in this interval not displayed. LIVER PROFILE:   Recent Labs     01/06/21  1937 01/07/21  0455 01/08/21  0338   AST 81* 111* 245*   ALT 34 37 42*   BILITOT 1.1* 1.1* 0.8*   ALKPHOS 76 71 62     PT/INR: No results for input(s): PROTIME, INR in the last 72 hours. APTT: No results for input(s): APTT in the last 72 hours. UA:  Recent Labs     01/06/21  1730   COLORU Yellow   PHUR 7.0   WBCUA 0-2   RBCUA 3-5*   BACTERIA Negative   CLARITYU Clear   SPECGRAV 1.004   LEUKOCYTESUR Negative   UROBILINOGEN 0.2   BILIRUBINUR Negative   BLOODU SMALL*   GLUCOSEU Negative       Cultures:  Negative so far  Films:  CXR reviewed by me and it showed hazy left lower lobe atelectasis versus infiltrate      Assessment:   This is a critically ill patient at risk of deterioration / death , needing close ICU

## 2021-01-08 NOTE — PLAN OF CARE
Problem: Falls - Risk of:  Goal: Will remain free from falls  Description: Will remain free from falls  Outcome: Ongoing  Goal: Absence of physical injury  Description: Absence of physical injury  Outcome: Ongoing     Problem: Discharge Planning:  Goal: Discharged to appropriate level of care  Description: Discharged to appropriate level of care  Outcome: Ongoing     Problem: Fluid Volume - Deficit:  Goal: Absence of fluid volume deficit signs and symptoms  Description: Absence of fluid volume deficit signs and symptoms  Outcome: Ongoing     Problem: Nutrition Deficit:  Goal: Ability to achieve adequate nutritional intake will improve  Description: Ability to achieve adequate nutritional intake will improve  Outcome: Ongoing     Problem: Sleep Pattern Disturbance:  Goal: Appears well-rested  Description: Appears well-rested  Outcome: Ongoing     Problem: Violence - Risk of, Self/Other-Directed:  Goal: Knowledge of developmental care interventions  Description: Absence of violence  Outcome: Ongoing     Problem: Coping:  Goal: Ability to cope will improve  Description: Ability to cope will improve  Outcome: Ongoing  Goal: Ability to identify appropriate support needs will improve  Description: Ability to identify appropriate support needs will improve  Outcome: Ongoing     Problem: Health Behavior:  Goal: Ability to manage health-related needs will improve  Description: Ability to manage health-related needs will improve  Outcome: Ongoing     Problem: Physical Regulation:  Goal: Signs of adequate cerebral perfusion will increase  Description: Signs of adequate cerebral perfusion will increase  Outcome: Ongoing  Goal: Ability to maintain a stable neurologic state will improve  Description: Ability to maintain a stable neurologic state will improve  Outcome: Ongoing     Problem: Safety:  Goal: Ability to remain free from injury will improve  Description: Ability to remain free from injury will improve  Outcome: Ongoing     Problem: Self-Concept:  Goal: Level of anxiety will decrease  Description: Level of anxiety will decrease  Outcome: Ongoing  Goal: Ability to verbalize feelings about condition will improve  Description: Ability to verbalize feelings about condition will improve  Outcome: Ongoing     Problem: Restraint Use - Nonviolent/Non-Self-Destructive Behavior:  Goal: Absence of restraint indications  Description: Absence of restraint indications  Outcome: Ongoing  Goal: Absence of restraint-related injury  Description: Absence of restraint-related injury  Outcome: Ongoing     Problem: Nutrition  Goal: Optimal nutrition therapy  Outcome: Ongoing

## 2021-01-08 NOTE — PROGRESS NOTES
Hospitalist Daily Progress Note  Name: Tomi Guallpa  Age: 38 y.o. Gender: male  CodeStatus: Full Code  Allergies: No Known Allergies    Chief Complaint:Altered Mental Status    Primary Care Provider: No primary care provider on file. InpatientTreatment Team: Treatment Team: Attending Provider: Blossom Collazo DO; Consulting Physician: Shaila Colon MD; Utilization Reviewer: Nj Flaherty RN; Consulting Physician: Alpesh Matias MD; Consulting Physician: Yaya Olmstead MD; Registered Nurse: Everardo Swanson RN; Patient Care Tech: Carlos Eduardo Marinelli; Registered Nurse: Berny Wu RN  Admission Date: 1/6/2021      Subjective: Patient is seen evaluated bedside. Na 134 today. Continued on precedex, scheduled librium, and CIWA protocol. Awake and oriented today. Afebrile. Vitals stable. I/O last 3 completed shifts: In: 7229 [I.V.:3160; NG/GT:600; IV Piggyback:100]  Out: 9161 [Urine:6950; Emesis/NG output:20]  No intake/output data recorded. Physical Exam  Constitutional:       Comments: Lethargic, awake alert and oriented   HENT:      Head: Normocephalic and atraumatic. Nose: Nose normal.      Mouth/Throat:      Mouth: Mucous membranes are moist.      Pharynx: Oropharynx is clear. Eyes:      Extraocular Movements: Extraocular movements intact. Pupils: Pupils are equal, round, and reactive to light. Comments: Pupils dilated bilaterally   Neck:      Musculoskeletal: Normal range of motion. Cardiovascular:      Rate and Rhythm: Normal rate. Heart sounds: No murmur. No friction rub. No gallop. Pulmonary:      Effort: No respiratory distress. Breath sounds: No wheezing, rhonchi or rales. Abdominal:      General: There is no distension. Tenderness: There is no abdominal tenderness. There is no guarding. Musculoskeletal: Normal range of motion. Right lower leg: No edema. Left lower leg: No edema. Neurological:      Comments: Awake alert and oriented.  No focal deficits   Psychiatric:         Mood and Affect: Mood normal.         Thought Content: Thought content normal.         Judgment: Judgment normal.      Comments: Unable to assess psychiatric condition given acute metabolic encephalopathy         Review of Systems  14 point ROS is reviewed and negative except for as above. Medications:  Reviewed    Infusion Medications:    dexmedetomidine 1.4 mcg/kg/hr (01/08/21 0500)    [Held by provider] sodium chloride Stopped (01/07/21 1615)    dextrose 150 mL/hr at 01/08/21 0650     Scheduled Medications:    desmopressin  4 mcg Intravenous Once    IVPB builder   Intravenous Daily    folic acid IVPB  1 mg Intravenous Daily    ampicillin-sulbactam  1.5 g Intravenous Q6H    chlordiazePOXIDE  20 mg Oral TID    sodium chloride flush  10 mL Intravenous 2 times per day    multivitamin  1 tablet Oral Daily     PRN Meds: sodium chloride flush, promethazine **OR** ondansetron, polyethylene glycol, acetaminophen **OR** acetaminophen, LORazepam **OR** LORazepam **OR** LORazepam **OR** LORazepam **OR** LORazepam **OR** LORazepam **OR** LORazepam **OR** LORazepam    Labs:   Recent Labs     01/06/21  1730 01/07/21  0455 01/08/21  0338   WBC 7.4 7.6 5.8   HGB 14.2 13.5* 14.2   HCT 40.5* 38.4* 42.1    121* 116*     Recent Labs     01/07/21  1514 01/07/21  1756 01/07/21  2255 01/08/21  0338   * 132* 132* 134*   K 3.6 3.3* 3.6 3.4   CL 99 101 99 99   CO2 19* 21 21 21   BUN 5* 5* 6 5*   CREATININE 0.71 0.73 0.66* 0.65*   CALCIUM 9.3 8.6 8.4* 8.4*   PHOS 3.0  --  2.9 2.9     Recent Labs     01/06/21  1937 01/07/21 0455 01/08/21  0338   AST 81* 111* 245*   ALT 34 37 42*   BILITOT 1.1* 1.1* 0.8*   ALKPHOS 76 71 62     No results for input(s): INR in the last 72 hours. No results for input(s): Yan Ramu in the last 72 hours.     Urinalysis:   Lab Results   Component Value Date    NITRU Negative 01/06/2021    WBCUA 0-2 01/06/2021    BACTERIA Negative 01/06/2021 RBCUA 3-5 01/06/2021    BLOODU SMALL 01/06/2021    SPECGRAV 1.004 01/06/2021    GLUCOSEU Negative 01/06/2021       Radiology:   Most recent    Chest CT      WITH CONTRAST:No results found for this or any previous visit. WITHOUT CONTRAST: No results found for this or any previous visit. CXR      2-view: No results found for this or any previous visit. Portable:   Results for orders placed during the hospital encounter of 01/06/21   XR CHEST PORTABLE    Narrative EXAMINATION: CHEST PORTABLE VIEW     CLINICAL HISTORY: NG tube placement    COMPARISONS: January 7, 2021 0910 hours     FINDINGS:    A total of 3 supine portable films are submitted. On the final third image the nasogastric tube now lies within the stomach. .  The cardiac silhouette is borderline enlarged. Pulmonary vascular unremarkable. Right sided trachea. Small area of atelectasis, patchy infiltrate left lower lobe. No Pneumothoraces. Impression ON THE THIRD IMAGE NASOGASTRIC TUBE IS WITHIN THE STOMACH. SMALL AREA OF ATELECTASIS, PATCHY INFILTRATE LEFT LOWER LOBE         Echo No results found for this or any previous visit. Assessment/Plan:    Active Hospital Problems    Diagnosis Date Noted    Hyponatremia [E87.1] 01/06/2021    Altered mental status [R41.82] 01/06/2021    Laceration of head without foreign body [S01.91XA] 01/06/2021    Alcohol use [Z72.89] 01/06/2021     Severe alcoholic DT with acute encephalopathy: Continue scheduled Librium, Precedex. Ativan per alcohol withdrawal protocol. Continue thiamine and folate supplementation. Alcohol cessation education when awake alert and amenable to consultation    Hyponatremia with potential seizure: Sodium up to 134 from 113 yesterday. Nephrology following. D5W 500ml/hr with 5mg DDAVP today. Possible aspiration: Repeat procalcitonin 0.22.  cont Unasyn. No fevers. Add mucinex, acapella.

## 2021-01-08 NOTE — PROGRESS NOTES
0730- received report from night shift nurse. Patient had uneventful night. RN begin to wean precedex gtt down to awake patient and perform assessment. 1300- Na+ reported to Dr. Hillary Lockwood. 1645- Na+ reported to Dr. Hillary Lockwood   1300- had Dr. Raymond Hannon come to assess patient at bedside. Patient is denying symptoms of alcohol withdraw. HR elevated into 140's sustaining. Gave medications but no change. Patient is not scoring high enough for PRN ativan on CIWA scale. 1500- patient ambulated to bedside commode with pCA and RN , was very unsteady and recquired hands on assistance. [id number]- spoke with Dr. Raymond Hannon again about elevated blood pressure. PRN orders placed. 1830- called report to 4 w RN. Tele applied to patient. Patient to go to 4 w by bed. Has bag of clothes and carmex . Mario Esposito - patient's wife remained at bedside during visiting hours today. She was updated of room change and will be in to see the patient tomorrow .

## 2021-01-08 NOTE — FLOWSHEET NOTE
Quiet most of night on precedex 1.4  Mcg  Receiving Ativan 2 mg x 2 per CIWA score  Wife called for update. Repositioned per self with assistance when unrestrained trys to reach for his feeding tube.  Vitals stable

## 2021-01-08 NOTE — CARE COORDINATION
Met with patient at the bedside to discuss the plan for ETOH rehabilitation. He was given a pamphlet for [employer].  The program was explained and he was encouraged to take advantage. I offered to contact them for him and he declined. I offered to wait while he contacted himself and he declined stating he will discuss with his spouse and make a determination. CM/LSW will need to follow up with the patient.   The patient plans to return to home with family at discharge

## 2021-01-08 NOTE — PROGRESS NOTES
Nephrology Progress Note    Assessment:  38y.o. year old male with history s/f EtoH use who presented with AMS.      1. Severe hyponatremia: possibly in setting of etoH withdrawal based on timing of last drink. Could also have chronic hyponatremia as well in setting of low solute (low BUN and Scr s/o this). Rapid improvement w/ 3% NS  2. EtOH abuse/withdrawal: on CIWA protol, on precedex  3. Transaminitis: 2/2 EtoH  4. HTN  5. Encephalopathy: likely due to EtOH withdrawal      Plan:  - awaiting next BMP further plans  - continue D5W at 150 ml/hr  - may need further doses of DDAVP, goal Na 126-128  - continue BMP Q4H      Patient Active Problem List:     Hyponatremia     Altered mental status     Laceration of head without foreign body     Alcohol use      Subjective:  Admit Date: 1/6/2021    Interval History: Na remains in the 130s, D5W bolus given this AM w/ DDAVP    Medications:  Scheduled Meds:   guaiFENesin  600 mg Oral BID    enoxaparin  40 mg Subcutaneous Daily    IVPB builder   Intravenous Daily    folic acid IVPB  1 mg Intravenous Daily    ampicillin-sulbactam  1.5 g Intravenous Q6H    chlordiazePOXIDE  20 mg Oral TID    sodium chloride flush  10 mL Intravenous 2 times per day    multivitamin  1 tablet Oral Daily     Continuous Infusions:   dexmedetomidine 1.2 mcg/kg/hr (01/08/21 0820)    [Held by provider] sodium chloride Stopped (01/07/21 1615)    dextrose 500 mL/hr at 01/08/21 0700       CBC:   Recent Labs     01/07/21  0455 01/08/21  0338   WBC 7.6 5.8   HGB 13.5* 14.2   * 116*     CMP:    Recent Labs     01/07/21  1756 01/07/21  2255 01/08/21  0338   * 132* 134*   K 3.3* 3.6 3.4    99 99   CO2 21 21 21   BUN 5* 6 5*   CREATININE 0.73 0.66* 0.65*   GLUCOSE 143* 128* 135*   CALCIUM 8.6 8.4* 8.4*   LABGLOM >60.0 >60.0 >60.0     Troponin: No results for input(s): TROPONINI in the last 72 hours. BNP: No results for input(s): BNP in the last 72 hours.   INR: No results for input(s): INR in the last 72 hours. Lipids: No results for input(s): CHOL, LDLDIRECT, TRIG, HDL, AMYLASE, LIPASE in the last 72 hours. Liver:   Recent Labs     01/08/21  0338   *   ALT 42*   ALKPHOS 62   PROT 6.3   LABALBU 3.7   BILITOT 0.8*     Iron:  No results for input(s): IRONS, FERRITIN in the last 72 hours. Invalid input(s): LABIRONS  Urinalysis: No results for input(s): UA in the last 72 hours.     Objective:  Vitals: /70   Pulse 66   Temp 98.1 °F (36.7 °C) (Axillary)   Resp 14   Ht 6' 1\" (1.854 m)   Wt 188 lb 7.9 oz (85.5 kg)   SpO2 99%   BMI 24.87 kg/m²    Wt Readings from Last 3 Encounters:   01/06/21 188 lb 7.9 oz (85.5 kg)      24HR INTAKE/OUTPUT:      Intake/Output Summary (Last 24 hours) at 1/8/2021 0935  Last data filed at 1/8/2021 0555  Gross per 24 hour   Intake 3860 ml   Output 6370 ml   Net -2510 ml     General: responsive, in no apparent distress  HEENT: normocephalic, atraumatic, anicteric  Lungs: non-labored respirations, clear to auscultation bilaterally  Heart: regular rate and rhythm, no murmurs or rubs  Abdomen: soft, non-tender, non-distended  Ext: no cyanosis, no peripheral edema  Neuro: alert, follows commands       Electronically signed by Dixie Calvo MD

## 2021-01-09 LAB
ALBUMIN SERPL-MCNC: 3.4 G/DL (ref 3.5–4.6)
ALP BLD-CCNC: 62 U/L (ref 35–104)
ALT SERPL-CCNC: 55 U/L (ref 0–41)
ANION GAP SERPL CALCULATED.3IONS-SCNC: 11 MEQ/L (ref 9–15)
ANION GAP SERPL CALCULATED.3IONS-SCNC: 12 MEQ/L (ref 9–15)
ANION GAP SERPL CALCULATED.3IONS-SCNC: 14 MEQ/L (ref 9–15)
ANION GAP SERPL CALCULATED.3IONS-SCNC: 14 MEQ/L (ref 9–15)
AST SERPL-CCNC: 382 U/L (ref 0–40)
BASOPHILS ABSOLUTE: 0 K/UL (ref 0–0.2)
BASOPHILS RELATIVE PERCENT: 0.3 %
BILIRUB SERPL-MCNC: 0.8 MG/DL (ref 0.2–0.7)
BUN BLDV-MCNC: 5 MG/DL (ref 6–20)
BUN BLDV-MCNC: 5 MG/DL (ref 6–20)
BUN BLDV-MCNC: 6 MG/DL (ref 6–20)
BUN BLDV-MCNC: 6 MG/DL (ref 6–20)
CALCIUM SERPL-MCNC: 8.4 MG/DL (ref 8.5–9.9)
CALCIUM SERPL-MCNC: 8.4 MG/DL (ref 8.5–9.9)
CALCIUM SERPL-MCNC: 8.6 MG/DL (ref 8.5–9.9)
CALCIUM SERPL-MCNC: 8.7 MG/DL (ref 8.5–9.9)
CHLORIDE BLD-SCNC: 89 MEQ/L (ref 95–107)
CHLORIDE BLD-SCNC: 90 MEQ/L (ref 95–107)
CHLORIDE BLD-SCNC: 90 MEQ/L (ref 95–107)
CHLORIDE BLD-SCNC: 91 MEQ/L (ref 95–107)
CO2: 21 MEQ/L (ref 20–31)
CO2: 22 MEQ/L (ref 20–31)
CO2: 23 MEQ/L (ref 20–31)
CO2: 24 MEQ/L (ref 20–31)
CREAT SERPL-MCNC: 0.52 MG/DL (ref 0.7–1.2)
CREAT SERPL-MCNC: 0.62 MG/DL (ref 0.7–1.2)
CREAT SERPL-MCNC: 0.63 MG/DL (ref 0.7–1.2)
CREAT SERPL-MCNC: 0.63 MG/DL (ref 0.7–1.2)
EOSINOPHILS ABSOLUTE: 0.1 K/UL (ref 0–0.7)
EOSINOPHILS RELATIVE PERCENT: 2.4 %
GFR AFRICAN AMERICAN: >60
GFR NON-AFRICAN AMERICAN: >60
GLOBULIN: 2.6 G/DL (ref 2.3–3.5)
GLUCOSE BLD-MCNC: 90 MG/DL (ref 70–99)
GLUCOSE BLD-MCNC: 92 MG/DL (ref 70–99)
GLUCOSE BLD-MCNC: 96 MG/DL (ref 70–99)
GLUCOSE BLD-MCNC: 97 MG/DL (ref 70–99)
HCT VFR BLD CALC: 37.1 % (ref 42–52)
HEMOGLOBIN: 13.1 G/DL (ref 14–18)
LYMPHOCYTES ABSOLUTE: 0.9 K/UL (ref 1–4.8)
LYMPHOCYTES RELATIVE PERCENT: 19.1 %
MCH RBC QN AUTO: 31.8 PG (ref 27–31.3)
MCHC RBC AUTO-ENTMCNC: 35.4 % (ref 33–37)
MCV RBC AUTO: 90 FL (ref 80–100)
MONOCYTES ABSOLUTE: 0.7 K/UL (ref 0.2–0.8)
MONOCYTES RELATIVE PERCENT: 15.5 %
NEUTROPHILS ABSOLUTE: 2.9 K/UL (ref 1.4–6.5)
NEUTROPHILS RELATIVE PERCENT: 62.7 %
PDW BLD-RTO: 13 % (ref 11.5–14.5)
PHOSPHORUS: 2.6 MG/DL (ref 2.3–4.8)
PHOSPHORUS: 2.9 MG/DL (ref 2.3–4.8)
PHOSPHORUS: 3.4 MG/DL (ref 2.3–4.8)
PLATELET # BLD: 130 K/UL (ref 130–400)
POTASSIUM REFLEX MAGNESIUM: 3.7 MEQ/L (ref 3.4–4.9)
POTASSIUM SERPL-SCNC: 3.3 MEQ/L (ref 3.4–4.9)
POTASSIUM SERPL-SCNC: 3.7 MEQ/L (ref 3.4–4.9)
POTASSIUM SERPL-SCNC: 3.7 MEQ/L (ref 3.4–4.9)
RBC # BLD: 4.13 M/UL (ref 4.7–6.1)
SODIUM BLD-SCNC: 125 MEQ/L (ref 135–144)
SODIUM BLD-SCNC: 126 MEQ/L (ref 135–144)
TOTAL PROTEIN: 6 G/DL (ref 6.3–8)
WBC # BLD: 4.6 K/UL (ref 4.8–10.8)

## 2021-01-09 PROCEDURE — 99232 SBSQ HOSP IP/OBS MODERATE 35: CPT | Performed by: INTERNAL MEDICINE

## 2021-01-09 PROCEDURE — APPSS30 APP SPLIT SHARED TIME 16-30 MINUTES: Performed by: STUDENT IN AN ORGANIZED HEALTH CARE EDUCATION/TRAINING PROGRAM

## 2021-01-09 PROCEDURE — 84100 ASSAY OF PHOSPHORUS: CPT

## 2021-01-09 PROCEDURE — 2580000003 HC RX 258: Performed by: NURSE PRACTITIONER

## 2021-01-09 PROCEDURE — 2580000003 HC RX 258: Performed by: PSYCHIATRY & NEUROLOGY

## 2021-01-09 PROCEDURE — 6360000002 HC RX W HCPCS: Performed by: PSYCHIATRY & NEUROLOGY

## 2021-01-09 PROCEDURE — 6370000000 HC RX 637 (ALT 250 FOR IP): Performed by: INTERNAL MEDICINE

## 2021-01-09 PROCEDURE — 1210000000 HC MED SURG R&B

## 2021-01-09 PROCEDURE — 36415 COLL VENOUS BLD VENIPUNCTURE: CPT

## 2021-01-09 PROCEDURE — 85025 COMPLETE CBC W/AUTO DIFF WBC: CPT

## 2021-01-09 PROCEDURE — 99233 SBSQ HOSP IP/OBS HIGH 50: CPT | Performed by: PSYCHIATRY & NEUROLOGY

## 2021-01-09 PROCEDURE — 2580000003 HC RX 258: Performed by: INTERNAL MEDICINE

## 2021-01-09 PROCEDURE — 6360000002 HC RX W HCPCS: Performed by: INTERNAL MEDICINE

## 2021-01-09 PROCEDURE — 6370000000 HC RX 637 (ALT 250 FOR IP): Performed by: PSYCHIATRY & NEUROLOGY

## 2021-01-09 PROCEDURE — 6370000000 HC RX 637 (ALT 250 FOR IP): Performed by: NURSE PRACTITIONER

## 2021-01-09 PROCEDURE — 80053 COMPREHEN METABOLIC PANEL: CPT

## 2021-01-09 RX ORDER — AMOXICILLIN AND CLAVULANATE POTASSIUM 875; 125 MG/1; MG/1
1 TABLET, FILM COATED ORAL EVERY 12 HOURS SCHEDULED
Status: DISCONTINUED | OUTPATIENT
Start: 2021-01-09 | End: 2021-01-11 | Stop reason: HOSPADM

## 2021-01-09 RX ADMIN — PROPRANOLOL HYDROCHLORIDE 20 MG: 20 TABLET ORAL at 20:47

## 2021-01-09 RX ADMIN — CHLORDIAZEPOXIDE HYDROCHLORIDE 20 MG: 10 CAPSULE ORAL at 01:11

## 2021-01-09 RX ADMIN — CHLORDIAZEPOXIDE HYDROCHLORIDE 20 MG: 10 CAPSULE ORAL at 11:07

## 2021-01-09 RX ADMIN — POTASSIUM CHLORIDE 10 MEQ: 7.46 INJECTION, SOLUTION INTRAVENOUS at 01:06

## 2021-01-09 RX ADMIN — GUAIFENESIN 600 MG: 600 TABLET ORAL at 01:11

## 2021-01-09 RX ADMIN — AMOXICILLIN AND CLAVULANATE POTASSIUM 1 TABLET: 875; 125 TABLET, FILM COATED ORAL at 20:47

## 2021-01-09 RX ADMIN — Medication 10 ML: at 11:16

## 2021-01-09 RX ADMIN — Medication 10 ML: at 09:02

## 2021-01-09 RX ADMIN — DESMOPRESSIN ACETATE 5 MCG: 4 SOLUTION INTRAVENOUS at 01:11

## 2021-01-09 RX ADMIN — SODIUM CHLORIDE 1.5 G: 900 INJECTION INTRAVENOUS at 01:08

## 2021-01-09 RX ADMIN — Medication 100 MG: at 11:06

## 2021-01-09 RX ADMIN — GUAIFENESIN 600 MG: 600 TABLET ORAL at 11:06

## 2021-01-09 RX ADMIN — SODIUM CHLORIDE 1.5 G: 900 INJECTION INTRAVENOUS at 11:10

## 2021-01-09 RX ADMIN — Medication 10 ML: at 20:47

## 2021-01-09 RX ADMIN — CHLORDIAZEPOXIDE HYDROCHLORIDE 20 MG: 10 CAPSULE ORAL at 20:47

## 2021-01-09 RX ADMIN — FOLIC ACID 1 MG: 1 TABLET ORAL at 11:07

## 2021-01-09 RX ADMIN — THERA TABS 1 TABLET: TAB at 11:07

## 2021-01-09 RX ADMIN — PROPRANOLOL HYDROCHLORIDE 20 MG: 20 TABLET ORAL at 11:21

## 2021-01-09 RX ADMIN — GUAIFENESIN 600 MG: 600 TABLET ORAL at 20:47

## 2021-01-09 RX ADMIN — CHLORDIAZEPOXIDE HYDROCHLORIDE 20 MG: 10 CAPSULE ORAL at 15:27

## 2021-01-09 RX ADMIN — PROPRANOLOL HYDROCHLORIDE 20 MG: 20 TABLET ORAL at 01:11

## 2021-01-09 RX ADMIN — LEVETIRACETAM 500 MG: 100 INJECTION, SOLUTION INTRAVENOUS at 04:37

## 2021-01-09 RX ADMIN — DEXTROSE MONOHYDRATE: 50 INJECTION, SOLUTION INTRAVENOUS at 06:30

## 2021-01-09 RX ADMIN — POTASSIUM CHLORIDE 40 MEQ: 20 TABLET, EXTENDED RELEASE ORAL at 01:10

## 2021-01-09 RX ADMIN — ENOXAPARIN SODIUM 40 MG: 40 INJECTION, SOLUTION INTRAVENOUS; SUBCUTANEOUS at 11:08

## 2021-01-09 RX ADMIN — LEVETIRACETAM 500 MG: 100 INJECTION, SOLUTION INTRAVENOUS at 17:41

## 2021-01-09 ASSESSMENT — ENCOUNTER SYMPTOMS
PHOTOPHOBIA: 0
SHORTNESS OF BREATH: 0
NAUSEA: 0
COUGH: 0
DIARRHEA: 0
VOMITING: 0
TROUBLE SWALLOWING: 0

## 2021-01-09 ASSESSMENT — PAIN SCALES - GENERAL
PAINLEVEL_OUTOF10: 0
PAINLEVEL_OUTOF10: 0

## 2021-01-09 ASSESSMENT — PAIN DESCRIPTION - PAIN TYPE: TYPE: ACUTE PAIN

## 2021-01-09 ASSESSMENT — PAIN DESCRIPTION - LOCATION: LOCATION: GENERALIZED

## 2021-01-09 ASSESSMENT — PAIN DESCRIPTION - DESCRIPTORS: DESCRIPTORS: DISCOMFORT

## 2021-01-09 NOTE — PROGRESS NOTES
INPATIENT PROGRESS NOTES    PATIENT NAME: Tonya Armando  MRN: [id number]  SERVICE DATE:  January 9, 2021   SERVICE TIME:  5:04 PM      PRIMARY SERVICE: Pulmonary Disease    CHIEF COMPLAIN: Delirium tremens    INTERVAL HPI: Patient seen and examined at bedside, Interval Notes, orders reviewed. Nursing notes noted  Patient is sitting in a chair. He is feeling better. No complaint of shortness of breath. No chest pain. No nausea vomiting diarrhea. Oxygen saturation on room air is 100%. He still has hyponatremia and sodium is 125    OBJECTIVE    Body mass index is 23.64 kg/m². PHYSICAL EXAM:  Vitals:  BP (!) 146/94   Pulse 81   Temp 98.1 °F (36.7 °C) (Oral)   Resp 18   Ht 6' 1\" (1.854 m)   Wt 179 lb 3.2 oz (81.3 kg)   SpO2 100%   BMI 23.64 kg/m²   General: Alert, awake . comfortable in bed, No distress. Head: Atraumatic , Normocephalic   Eyes: PERRL. No sclera icterus. No conjunctival injection. No discharge   ENT: No nasal  discharge. Pharynx clear. lips, teeth, mucosa and gums are normal, tongue protrudes in the midline  Neck:  Trachea midline. No thyromegaly, no JVD, No cervical adenopathy. Chest : Bilaterally symmetrical ,Normal effort,  No accessory muscle use  Lung : . Fair BS bilateral, decreased BS at bases. No Rales. No wheezing. No rhonchi. Heart[de-identified] Normal  rate. Regular rhythm. No mumur ,  Rub or gallop  ABD: Non-tender. Non-distended. No masses. No organmegaly. Normal bowel sounds. No hernia.   Ext : No Pitting both leg , No Cyanosis No clubbing  Neuro: no focal weakness          DATA:   Recent Labs     01/08/21  0338 01/09/21  0551   WBC 5.8 4.6*   HGB 14.2 13.1*   HCT 42.1 37.1*   MCV 91.6 90.0   * 130     Recent Labs     01/08/21  0338 01/08/21  0338 01/09/21  0551 01/09/21  1058   *   < > 125*  126*  125* 125*   K 3.4   < > 3.7  3.7  3.7 3.3*   CL 99   < > 91*  90*  90* 89*   CO2 21   < > 23  22  21 24   BUN 5*   < > 5*  6  6 5*   CREATININE 0.65*   < > 0.52* 0.62*  0.63* 0.63*   GLUCOSE 135*   < > 90  96  97 92   CALCIUM 8.4*   < > 8.4*  8.7  8.6 8.4*   PROT 6.3  --  6.0*  --    LABALBU 3.7  --  3.4*  --    BILITOT 0.8*  --  0.8*  --    ALKPHOS 62  --  62  --    *  --  382*  --    ALT 42*  --  55*  --    LABGLOM >60.0   < > >60.0  >60.0  >60.0 >60.0   GFRAA >60.0   < > >60.0  >60.0  >60.0 >60.0   GLOB 2.6  --  2.6  --     < > = values in this interval not displayed. MV Settings:          No results for input(s): PHART, UFV3VRE, PO2ART, MYT7PFP, BEART, W6PUUBCO in the last 72 hours. O2 Device: None (Room air)  O2 Flow Rate (L/min): 0 L/min    DIET GENERAL;     MEDICATIONS during current hospitalization:    Continuous Infusions:    Scheduled Meds:   guaiFENesin  600 mg Oral BID    enoxaparin  40 mg Subcutaneous Daily    thiamine  100 mg Oral Daily    folic acid  1 mg Oral Daily    propranolol  20 mg Oral BID    levetiracetam  500 mg Intravenous Q12H    ampicillin-sulbactam  1.5 g Intravenous Q6H    chlordiazePOXIDE  20 mg Oral TID    sodium chloride flush  10 mL Intravenous 2 times per day    multivitamin  1 tablet Oral Daily       PRN Meds:labetalol **OR** labetalol, sodium chloride flush, promethazine **OR** ondansetron, polyethylene glycol, acetaminophen **OR** acetaminophen, LORazepam **OR** LORazepam **OR** LORazepam **OR** LORazepam **OR** LORazepam **OR** LORazepam **OR** LORazepam **OR** LORazepam    Radiology  Ct Head Wo Contrast    Result Date: 1/7/2021  CT HEAD WO CONTRAST : 1/6/2021 CLINICAL HISTORY:  ams . COMPARISON: None available. TECHNIQUE: Spiral unenhanced images were obtained of the head, with routine multiplanar reconstructions performed. All CT scans at this facility use dose modulation, iterative reconstruction, and/or weight based dosing when appropriate to reduce radiation dose to as low as reasonably achievable.  FINDINGS: There is no intracranial hemorrhage, mass effect, midline shift, extra-axial collection, evidence of hydrocephalus, recent ischemic infarct, or skull fracture identified. There is no significant atrophy or white matter changes, for age. The mastoid air cells and visualized paranasal sinuses are essentially clear. NO ACUTE INTRACRANIAL PROCESS IDENTIFIED. Ct Cervical Spine Wo Contrast    Result Date: 1/7/2021  CT CERVICAL SPINE WO CONTRAST CLINICAL HISTORY:  trauma COMPARISON: NONE Findings: Multiple serial axial images of the cervical spine from the base of the skull through the upper thoracic vertebra with both sagittal and coronal reconstructions was performed. There is straightening of the normal expected cervical lordosis. There is multilevel degenerative joint disease. Prevertebral  soft tissues are  unremarkable. The disk spaces are intact No acute fractures or spondylo-listhesis. .     NO ACUTE FRACTURES. All CT scans at this facility use dose modulation, iterative reconstruction, and/or weight based dosing when appropriate to reduce radiation dose to as low as reasonably achievable. Xr Chest Portable    Result Date: 1/7/2021  EXAMINATION: CHEST PORTABLE VIEW  CLINICAL HISTORY: Confusion COMPARISONS: None  FINDINGS: Single  views of the chest is submitted. Limited exam due to low lung volume. The cardiac silhouette is enlarged Pulmonary vascular unremarkable. Right sided trachea. Infiltrate consolidation left lower lobe with trace left pleural effusion. No Pneumothoraces. INFILTRATE CONSOLIDATION LEFT LOWER LOBE WITH TRACE LEFT PLEURAL EFFUSION    Xr Chest Portable    Result Date: 1/7/2021  EXAMINATION: CHEST PORTABLE VIEW  CLINICAL HISTORY: NG tube placement COMPARISONS: January 7, 2021 0910 hours  FINDINGS: A total of 3 supine portable films are submitted. On the final third image the nasogastric tube now lies within the stomach. .  The cardiac silhouette is borderline enlarged.  Pulmonary vascular unremarkable. Right sided trachea. Small area of atelectasis, patchy infiltrate left lower lobe. No Pneumothoraces. ON THE THIRD IMAGE NASOGASTRIC TUBE IS WITHIN THE STOMACH. SMALL AREA OF ATELECTASIS, PATCHY INFILTRATE LEFT LOWER LOBE       IMPRESSION AND SUGGESTION:  1. Acute encephalopathy, resolved  2. Delirium tremens, improved  3. Probable alcohol withdrawal seizure  4. Aspiration pneumonia  5. Hyponatremia    He is on IV Unasyn. He is afebrile will change to p.o. Augmentin. He is not having any cough or shortness of breath. Chest x-ray showed left lower lobe consolidation.   Wife at bedside      Electronically signed by Reina Victoria MD, FCCP on 1/9/2021 at 5:04 PM

## 2021-01-09 NOTE — PROGRESS NOTES
Renal Progress Note    Assessment:  38y.o. year old male with history s/f EtoH use who presented with AMS.      1. Severe hyponatremia: possibly in setting of etoH withdrawal based on timing of last drink. Could also have chronic hyponatremia as well in setting of low solute (low BUN and Scr s/o this). Rapid improvement w/ 3% NS  2. EtOH abuse/withdrawal: on CIWA protol, on precedex  3. Transaminitis: 2/2 EtoH  4. HTN  5.  Encephalopathy: likely due to EtOH withdrawal      Plan:  -Hep-Lock IV fluids observe for the next 24-hour with every 12 hours sodium check  Patient Active Problem List:     Hyponatremia     Altered mental status     Laceration of head without foreign body     Alcohol use      Subjective:   Admit Date: 1/6/2021    Interval History: Patient seen and examined uneventful night expressed no complaint denied any motor or sensory logical deficit no shortness of breath or chest pain no nausea emesis no tingling or numbness no any other organ system related symptoms  Medications:   Scheduled Meds:   guaiFENesin  600 mg Oral BID    enoxaparin  40 mg Subcutaneous Daily    thiamine  100 mg Oral Daily    folic acid  1 mg Oral Daily    propranolol  20 mg Oral BID    levetiracetam  500 mg Intravenous Q12H    ampicillin-sulbactam  1.5 g Intravenous Q6H    chlordiazePOXIDE  20 mg Oral TID    sodium chloride flush  10 mL Intravenous 2 times per day    multivitamin  1 tablet Oral Daily     Continuous Infusions:    CBC:   Recent Labs     01/08/21  0338 01/09/21  0551   WBC 5.8 4.6*   HGB 14.2 13.1*   * 130     CMP:    Recent Labs     01/08/21  2308 01/09/21  0551 01/09/21  1058   * 125*  126*  125* 125*   K 3.2* 3.7  3.7  3.7 3.3*   CL 91* 91*  90*  90* 89*   CO2 24 23  22  21 24   BUN 7 5*  6  6 5*   CREATININE 0.59* 0.52*  0.62*  0.63* 0.63*   GLUCOSE 104* 90  96  97 92   CALCIUM 8.3* 8.4*  8.7  8.6 8.4*   LABGLOM >60.0 >60.0  >60.0  >60.0 >60.0     Troponin: No results for input(s): TROPONINI in the last 72 hours. BNP: No results for input(s): BNP in the last 72 hours. INR: No results for input(s): INR in the last 72 hours. Lipids: No results for input(s): CHOL, LDLDIRECT, TRIG, HDL, AMYLASE, LIPASE in the last 72 hours. Liver:   Recent Labs     01/09/21  0551   *   ALT 55*   ALKPHOS 62   PROT 6.0*   LABALBU 3.4*   BILITOT 0.8*     Iron:  No results for input(s): IRONS, FERRITIN in the last 72 hours. Invalid input(s): LABIRONS  Urinalysis: No results for input(s): UA in the last 72 hours. Objective:   Vitals: BP (!) 146/94   Pulse 81   Temp 98.1 °F (36.7 °C) (Oral)   Resp 18   Ht 6' 1\" (1.854 m)   Wt 179 lb 3.2 oz (81.3 kg)   SpO2 100%   BMI 23.64 kg/m²    Wt Readings from Last 3 Encounters:   01/08/21 179 lb 3.2 oz (81.3 kg)      24HR INTAKE/OUTPUT:      Intake/Output Summary (Last 24 hours) at 1/9/2021 1500  Last data filed at 1/9/2021 1130  Gross per 24 hour   Intake 3030 ml   Output 2300 ml   Net 730 ml       Constitutional:  Alert, awake, no apparent distress   Skin:normal, no rash  HEENT:sclera anicteric.   Head atraumatic normocephalic  Neck:supple with no thyromegally  Cardiovascular:  S1, S2 without m/r/g   Respiratory:  CTA B without w/r/rAbdomen: +bs, soft, nt  Ext: No clinically significant LE edema  Musculoskeletal:Intact  Neuro:Alert and oriented with no deficit    General physical exam the patient should be considered as euvolemic with no clinical evidence of increase or contraction of extracellular fluid volume      Electronically signed by Larry Villavicencio MD on 1/9/2021 at 3:00 PM

## 2021-01-09 NOTE — PROGRESS NOTES
[facility] Neurology Daily Progress Note  Name: Jordana Green  Age: 38 y.o. Gender: male  CodeStatus: Full Code  Allergies: No Known Allergies    Chief Complaint:Altered Mental Status    Primary Care Provider: No primary care provider on file. InpatientTreatment Team: Treatment Team: Attending Provider: Ashley Mercer DO; Consulting Physician: Rene Brasher MD; Utilization Reviewer: Marilee Schultz, RN; Consulting Physician: Baljeet Giles MD; Consulting Physician: Cece Goetz MD; : Mariah Ding, WILMA; Utilization Reviewer: Nick Pires, WILMA; Registered Nurse: Florencio Stephens RN; Patient Care Tech: Juvenalstacia Hickey; LPN: Lesley Adams LPN  Admission Date: 1/6/2021      HPI   HPI 50-year-old right-handed gentleman was found down at home. Outpatient history of alcohol use of at least 10 years drinks beer up to 12 beers a day and stopped on the first January. He started to have some shaking and then may have had a seizure. He still in withdrawal and is on CIWA protocol. He has no history of seizures.      Patient is alert and oriented x3, in no acute distress, and cooperative. Wife is present. Reports that he is feeling better. Wife agrees that he is improving. States that he does feel a bit \"dizzy\" upon standing. Wife states that he is a bit agitated with having to be in the hospital, as he does not like hospitals. Denies hallucinations. Denies seizure activity. Denies any new or worsening symptoms including headache, changes in vision, trouble swallowing, shortness of breath, chest pain, nausea, vomiting, or diarrhea. Still in withdrawal,  Vitals:    01/09/21 0757   BP: (!) 146/94   Pulse: 81   Resp: 18   Temp: 98.1 °F (36.7 °C)   SpO2: 100%      Review of Systems   Constitutional: Negative for chills and fever. HENT: Negative for congestion and trouble swallowing. Eyes: Negative for photophobia and visual disturbance.    Respiratory: Negative for cough and shortness of breath. Cardiovascular: Negative for chest pain. Gastrointestinal: Negative for diarrhea, nausea and vomiting. Musculoskeletal: Negative. Skin: Negative. Neurological: Positive for dizziness. Negative for tremors, seizures, syncope, facial asymmetry, speech difficulty, weakness, light-headedness, numbness and headaches. Psychiatric/Behavioral: Negative for hallucinations. tremors  Physical Exam  Vitals signs and nursing note reviewed. Constitutional:       Appearance: Normal appearance. HENT:      Head: Normocephalic and atraumatic. Eyes:      Conjunctiva/sclera: Conjunctivae normal.   Cardiovascular:      Rate and Rhythm: Normal rate and regular rhythm. Pulmonary:      Effort: Pulmonary effort is normal.      Breath sounds: Normal breath sounds. Musculoskeletal: Normal range of motion. Skin:     General: Skin is warm and dry. Neurological:      General: No focal deficit present. Mental Status: He is alert and oriented to person, place, and time. Deep Tendon Reflexes:      Reflex Scores:       Bicep reflexes are 2+ on the right side and 2+ on the left side. Brachioradialis reflexes are 2+ on the right side and 2+ on the left side. Patellar reflexes are 2+ on the right side and 2+ on the left side. Psychiatric:         Mood and Affect: Mood normal.         Speech: Speech normal.         Behavior: Behavior normal.       Neurologic Exam     Mental Status   Oriented to person, place, and time. Speech: speech is normal     Cranial Nerves     CN VII   Facial expression full, symmetric.      Motor Exam     Strength   Right biceps: 5/5  Left biceps: 5/5  Right triceps: 5/5  Left triceps: 5/5  Right interossei: 5/5  Left interossei: 5/5  Right iliopsoas: 5/5  Left iliopsoas: 5/5  Right quadriceps: 5/5  Left quadriceps: 5/5    Gait, Coordination, and Reflexes     Tremor   Resting tremor: absent  Intention tremor: absent    Reflexes   Right brachioradialis: 2+  Left brachioradialis: 2+  Right biceps: 2+  Left biceps: 2+  Right patellar: 2+  Left patellar: 2+      tremors    Medications:  Reviewed    Infusion Medications:   Scheduled Medications:    guaiFENesin  600 mg Oral BID    enoxaparin  40 mg Subcutaneous Daily    thiamine  100 mg Oral Daily    folic acid  1 mg Oral Daily    propranolol  20 mg Oral BID    levetiracetam  500 mg Intravenous Q12H    ampicillin-sulbactam  1.5 g Intravenous Q6H    chlordiazePOXIDE  20 mg Oral TID    sodium chloride flush  10 mL Intravenous 2 times per day    multivitamin  1 tablet Oral Daily     PRN Meds: labetalol **OR** labetalol, sodium chloride flush, promethazine **OR** ondansetron, polyethylene glycol, acetaminophen **OR** acetaminophen, LORazepam **OR** LORazepam **OR** LORazepam **OR** LORazepam **OR** LORazepam **OR** LORazepam **OR** LORazepam **OR** LORazepam    Labs:   Recent Labs     01/07/21 0455 01/08/21 0338 01/09/21  0551   WBC 7.6 5.8 4.6*   HGB 13.5* 14.2 13.1*   HCT 38.4* 42.1 37.1*   * 116* 130     Recent Labs     01/08/21  2308 01/09/21  0551 01/09/21  1058   * 125*  126*  125* 125*   K 3.2* 3.7  3.7  3.7 3.3*   CL 91* 91*  90*  90* 89*   CO2 24 23  22  21 24   BUN 7 5*  6  6 5*   CREATININE 0.59* 0.52*  0.62*  0.63* 0.63*   CALCIUM 8.3* 8.4*  8.7  8.6 8.4*   PHOS 3.3 3.4 2.6     Recent Labs     01/07/21 0455 01/08/21 0338 01/09/21  0551   * 245* 382*   ALT 37 42* 55*   BILITOT 1.1* 0.8* 0.8*   ALKPHOS 71 62 62     No results for input(s): INR in the last 72 hours. No results for input(s): Harika Hahn in the last 72 hours. Urinalysis:   Lab Results   Component Value Date    NITRU Negative 01/06/2021    WBCUA 0-2 01/06/2021    BACTERIA Negative 01/06/2021    RBCUA 3-5 01/06/2021    BLOODU SMALL 01/06/2021    SPECGRAV 1.004 01/06/2021    GLUCOSEU Negative 01/06/2021       Radiology:   Most recent    EEG No procedure found.     MRI of Brain No results found for this or any previous visit. No results found for this or any previous visit. MRA of the Head and Neck: No results found for this or any previous visit. No results found for this or any previous visit. No results found for this or any previous visit. CT of the Head:   Results for orders placed during the hospital encounter of 01/06/21   CT Head WO Contrast    Narrative CT HEAD WO CONTRAST : 1/6/2021    CLINICAL HISTORY:  ams . COMPARISON: None available. TECHNIQUE: Spiral unenhanced images were obtained of the head, with routine multiplanar reconstructions performed. All CT scans at this facility use dose modulation, iterative reconstruction, and/or weight based dosing when appropriate to reduce radiation dose to as low as reasonably achievable. FINDINGS:    There is no intracranial hemorrhage, mass effect, midline shift, extra-axial collection, evidence of hydrocephalus, recent ischemic infarct, or skull fracture identified. There is no significant atrophy or white matter changes, for age. The mastoid air cells and visualized paranasal sinuses are essentially clear. Impression NO ACUTE INTRACRANIAL PROCESS IDENTIFIED. No results found for this or any previous visit. No results found for this or any previous visit. Carotid duplex: No results found for this or any previous visit. No results found for this or any previous visit. No results found for this or any previous visit. Echo No results found for this or any previous visit. Assessment/Plan:  Probable generalized seizure secondary to alcohol withdrawal.  The patient is an active withdrawal and delirium. Patient is tremulous on top. Patient should be covered with Keppra for a few days. Patient should be on CIWA protocol recommended propranolol for tachycardia as this is part of the withdrawal.  CT scan of the head is normal    Patient is improving.   Does have a feeling of \"dizziness\", more consistent with lightheadedness, upon standing. Will order orthostatics. Remains hypertensive although blood pressure has been improving. No longer tachycardic. Is now afebrile. Patient is hyponatremic. Last sodium was 125. Overall, this is improved from baseline as his sodium was 112 on 1/6. Patient reports some agitation, but this appears to be secondary to current situation and not a side effect of Keppra. We will continue to monitor. No seizures. No new focal deficits. Patient overall stable from a neurological perspective. I have personally performed a face to face diagnostic evaluation on this patient, reviewed all data and investigations, and am the sole provider of all clinical decisions on the neurological status of this patient. pt still in withdrawal, mild tremors, non focal      Gabriel R.  Sho Foster MD, Jude Pollard, American Board of Psychiatry & Neurology  Board Certified in Vascular Neurology  Board Certified in Neuromuscular Medicine  Certified in Neurorehabilitation                  Collaborating physicians: Dr Sho Foster    Electronically signed by Juan Jose Curry PA-C on 1/9/2021 at 1:44 PM

## 2021-01-09 NOTE — PROGRESS NOTES
Assessment completed this shift. Alert and oriented x4. Denies pain or nausea. Up with standby assist. Follows commands. Wife at bedside.   Electronically signed by Mundo Proctor RN on 1/9/2021 at 11:19 AM

## 2021-01-09 NOTE — PLAN OF CARE
Problem: Falls - Risk of:  Goal: Will remain free from falls  Outcome: Ongoing  Goal: Absence of physical injury  Outcome: Ongoing     Problem: Discharge Planning:  Goal: Discharged to appropriate level of care  Outcome: Ongoing     Problem: Fluid Volume - Deficit:  Goal: Absence of fluid volume deficit signs and symptoms  Outcome: Ongoing     Problem: Coping:  Goal: Ability to cope will improve  Outcome: Ongoing  Goal: Ability to identify appropriate support needs will improve  Outcome: Ongoing     Problem: Physical Regulation:  Goal: Signs of adequate cerebral perfusion will increase  Outcome: Ongoing  Goal: Ability to maintain a stable neurologic state will improve  Outcome: Ongoing     Problem: Safety:  Goal: Ability to remain free from injury will improve  Outcome: Ongoing     Problem: Skin Integrity:  Goal: Will show no infection signs and symptoms  Outcome: Ongoing  Goal: Absence of new skin breakdown  Outcome: Ongoing

## 2021-01-09 NOTE — FLOWSHEET NOTE
01/09/21 1417   Vital Signs   Blood Pressure Lying 153/104   Pulse Lying 74 PER MINUTE   Blood Pressure Sitting 151/105   Pulse Sitting 72 PER MINUTE   Blood Pressure Standing 146/107   Pulse Standing 76 PER MINUTE   Orthostatics completed per order.   Electronically signed by Sima Clark RN on 1/9/2021 at 2:51 PM

## 2021-01-09 NOTE — PROGRESS NOTES
Pt's family here with the pt, he remained on a 1:1 for safety reasons risk for falls.   Pt's nurse is with the pt at this time and I was removed as his 1:1

## 2021-01-10 LAB
ALBUMIN SERPL-MCNC: 3.8 G/DL (ref 3.5–4.6)
ALP BLD-CCNC: 68 U/L (ref 35–104)
ALT SERPL-CCNC: 75 U/L (ref 0–41)
ANION GAP SERPL CALCULATED.3IONS-SCNC: 12 MEQ/L (ref 9–15)
ANION GAP SERPL CALCULATED.3IONS-SCNC: 13 MEQ/L (ref 9–15)
AST SERPL-CCNC: 437 U/L (ref 0–40)
BASOPHILS ABSOLUTE: 0 K/UL (ref 0–0.2)
BASOPHILS RELATIVE PERCENT: 0.6 %
BILIRUB SERPL-MCNC: 0.5 MG/DL (ref 0.2–0.7)
BUN BLDV-MCNC: 6 MG/DL (ref 6–20)
BUN BLDV-MCNC: 8 MG/DL (ref 6–20)
CALCIUM SERPL-MCNC: 8.9 MG/DL (ref 8.5–9.9)
CALCIUM SERPL-MCNC: 9.1 MG/DL (ref 8.5–9.9)
CHLORIDE BLD-SCNC: 93 MEQ/L (ref 95–107)
CHLORIDE BLD-SCNC: 96 MEQ/L (ref 95–107)
CO2: 27 MEQ/L (ref 20–31)
CO2: 27 MEQ/L (ref 20–31)
CREAT SERPL-MCNC: 0.65 MG/DL (ref 0.7–1.2)
CREAT SERPL-MCNC: 0.85 MG/DL (ref 0.7–1.2)
EOSINOPHILS ABSOLUTE: 0.1 K/UL (ref 0–0.7)
EOSINOPHILS RELATIVE PERCENT: 2.6 %
GFR AFRICAN AMERICAN: >60
GFR AFRICAN AMERICAN: >60
GFR NON-AFRICAN AMERICAN: >60
GFR NON-AFRICAN AMERICAN: >60
GLOBULIN: 2.9 G/DL (ref 2.3–3.5)
GLUCOSE BLD-MCNC: 119 MG/DL (ref 70–99)
GLUCOSE BLD-MCNC: 92 MG/DL (ref 70–99)
HCT VFR BLD CALC: 39.3 % (ref 42–52)
HEMOGLOBIN: 13.8 G/DL (ref 14–18)
LYMPHOCYTES ABSOLUTE: 0.9 K/UL (ref 1–4.8)
LYMPHOCYTES RELATIVE PERCENT: 22.2 %
MAGNESIUM: 2 MG/DL (ref 1.7–2.4)
MCH RBC QN AUTO: 31.5 PG (ref 27–31.3)
MCHC RBC AUTO-ENTMCNC: 35 % (ref 33–37)
MCV RBC AUTO: 89.9 FL (ref 80–100)
MONOCYTES ABSOLUTE: 0.9 K/UL (ref 0.2–0.8)
MONOCYTES RELATIVE PERCENT: 23.5 %
NEUTROPHILS ABSOLUTE: 2.1 K/UL (ref 1.4–6.5)
NEUTROPHILS RELATIVE PERCENT: 51.1 %
PDW BLD-RTO: 12.9 % (ref 11.5–14.5)
PHOSPHORUS: 3.5 MG/DL (ref 2.3–4.8)
PHOSPHORUS: 3.6 MG/DL (ref 2.3–4.8)
PHOSPHORUS: 3.7 MG/DL (ref 2.3–4.8)
PHOSPHORUS: 4 MG/DL (ref 2.3–4.8)
PLATELET # BLD: 170 K/UL (ref 130–400)
POTASSIUM REFLEX MAGNESIUM: 3.5 MEQ/L (ref 3.4–4.9)
POTASSIUM SERPL-SCNC: 3.8 MEQ/L (ref 3.4–4.9)
RBC # BLD: 4.37 M/UL (ref 4.7–6.1)
SODIUM BLD-SCNC: 133 MEQ/L (ref 135–144)
SODIUM BLD-SCNC: 135 MEQ/L (ref 135–144)
TOTAL PROTEIN: 6.7 G/DL (ref 6.3–8)
WBC # BLD: 4 K/UL (ref 4.8–10.8)

## 2021-01-10 PROCEDURE — 6360000002 HC RX W HCPCS: Performed by: PSYCHIATRY & NEUROLOGY

## 2021-01-10 PROCEDURE — 85025 COMPLETE CBC W/AUTO DIFF WBC: CPT

## 2021-01-10 PROCEDURE — 99233 SBSQ HOSP IP/OBS HIGH 50: CPT | Performed by: PSYCHIATRY & NEUROLOGY

## 2021-01-10 PROCEDURE — 84100 ASSAY OF PHOSPHORUS: CPT

## 2021-01-10 PROCEDURE — 36415 COLL VENOUS BLD VENIPUNCTURE: CPT

## 2021-01-10 PROCEDURE — 1210000000 HC MED SURG R&B

## 2021-01-10 PROCEDURE — 6360000002 HC RX W HCPCS: Performed by: INTERNAL MEDICINE

## 2021-01-10 PROCEDURE — 83735 ASSAY OF MAGNESIUM: CPT

## 2021-01-10 PROCEDURE — 2580000003 HC RX 258: Performed by: NURSE PRACTITIONER

## 2021-01-10 PROCEDURE — 6370000000 HC RX 637 (ALT 250 FOR IP): Performed by: PSYCHIATRY & NEUROLOGY

## 2021-01-10 PROCEDURE — 6370000000 HC RX 637 (ALT 250 FOR IP): Performed by: NURSE PRACTITIONER

## 2021-01-10 PROCEDURE — APPSS30 APP SPLIT SHARED TIME 16-30 MINUTES: Performed by: STUDENT IN AN ORGANIZED HEALTH CARE EDUCATION/TRAINING PROGRAM

## 2021-01-10 PROCEDURE — 2580000003 HC RX 258: Performed by: PSYCHIATRY & NEUROLOGY

## 2021-01-10 PROCEDURE — 6370000000 HC RX 637 (ALT 250 FOR IP): Performed by: INTERNAL MEDICINE

## 2021-01-10 PROCEDURE — 99232 SBSQ HOSP IP/OBS MODERATE 35: CPT | Performed by: INTERNAL MEDICINE

## 2021-01-10 PROCEDURE — 80053 COMPREHEN METABOLIC PANEL: CPT

## 2021-01-10 RX ORDER — CHLORDIAZEPOXIDE HYDROCHLORIDE 5 MG/1
5 CAPSULE, GELATIN COATED ORAL 3 TIMES DAILY
Status: DISCONTINUED | OUTPATIENT
Start: 2021-01-10 | End: 2021-01-11 | Stop reason: HOSPADM

## 2021-01-10 RX ORDER — CHLORDIAZEPOXIDE HYDROCHLORIDE 5 MG/1
5 CAPSULE, GELATIN COATED ORAL 3 TIMES DAILY
Status: DISCONTINUED | OUTPATIENT
Start: 2021-01-10 | End: 2021-01-10

## 2021-01-10 RX ADMIN — THERA TABS 1 TABLET: TAB at 11:32

## 2021-01-10 RX ADMIN — GUAIFENESIN 600 MG: 600 TABLET ORAL at 11:32

## 2021-01-10 RX ADMIN — Medication 100 MG: at 11:32

## 2021-01-10 RX ADMIN — ENOXAPARIN SODIUM 40 MG: 40 INJECTION, SOLUTION INTRAVENOUS; SUBCUTANEOUS at 11:35

## 2021-01-10 RX ADMIN — LEVETIRACETAM 500 MG: 100 INJECTION, SOLUTION INTRAVENOUS at 16:20

## 2021-01-10 RX ADMIN — PROPRANOLOL HYDROCHLORIDE 20 MG: 20 TABLET ORAL at 21:31

## 2021-01-10 RX ADMIN — AMOXICILLIN AND CLAVULANATE POTASSIUM 1 TABLET: 875; 125 TABLET, FILM COATED ORAL at 21:31

## 2021-01-10 RX ADMIN — PROPRANOLOL HYDROCHLORIDE 20 MG: 20 TABLET ORAL at 11:33

## 2021-01-10 RX ADMIN — AMOXICILLIN AND CLAVULANATE POTASSIUM 1 TABLET: 875; 125 TABLET, FILM COATED ORAL at 11:32

## 2021-01-10 RX ADMIN — GUAIFENESIN 600 MG: 600 TABLET ORAL at 21:31

## 2021-01-10 RX ADMIN — Medication 10 ML: at 11:46

## 2021-01-10 RX ADMIN — LEVETIRACETAM 500 MG: 100 INJECTION, SOLUTION INTRAVENOUS at 06:15

## 2021-01-10 RX ADMIN — CHLORDIAZEPOXIDE HYDROCHLORIDE 5 MG: 5 CAPSULE ORAL at 21:31

## 2021-01-10 RX ADMIN — Medication 10 ML: at 09:00

## 2021-01-10 RX ADMIN — CHLORDIAZEPOXIDE HYDROCHLORIDE 5 MG: 5 CAPSULE ORAL at 11:54

## 2021-01-10 RX ADMIN — FOLIC ACID 1 MG: 1 TABLET ORAL at 11:33

## 2021-01-10 ASSESSMENT — ENCOUNTER SYMPTOMS
PHOTOPHOBIA: 0
NAUSEA: 0
DIARRHEA: 0
TROUBLE SWALLOWING: 0
VOMITING: 0
CHEST TIGHTNESS: 0
COLOR CHANGE: 0
SHORTNESS OF BREATH: 0

## 2021-01-10 ASSESSMENT — PAIN SCALES - GENERAL: PAINLEVEL_OUTOF10: 0

## 2021-01-10 NOTE — CARE COORDINATION
Met with patient & his wife. He is up in chair eating lunch. He states he is feeling much better today. He states he hasn't called \"[facility]\" yet, but will notify us when he does so someone is present when he calls. PCP list given. [facility] AT [place] Case Management Initial Discharge Assessment    Met with Patient to discuss discharge plan. PCP: No primary care provider on file. Date of Last Visit: N/A    If no PCP, list provided? Yes    Discharge Planning    Living Arrangements: independently at home    Who do you live with? WIFE AND CHILD    Who helps you with your care:  self    If lives at home:     Do you have any barriers navigating in your home? no    Patient can perform ADL? Yes    Current Services (outpatient and in home) :  None    Dialysis: No    Is transportation available to get to your appointments? Yes    DME Equipment:  no    Respiratory equipment: None    Respiratory provider:  no     Pharmacy:  yes - [facility] [place]    Consult with Medication Assistance Program?  No      Patient agreeable to [facility]? Declined    Patient agreeable to SNF/Rehab? Declined    Other discharge needs identified? Other \"[facility]\" & [facility]    Freedom of choice list provided with basic dialogue that supports the patient's individualized plan of care/goals and shares the quality data associated with the providers. Yes    Does Patient Have a High-Risk for Readmission Diagnosis (CHF, PN, MI, COPD)? No    The plan for Transition of Care is related to the following treatment goals:ETOH WITHDRAWALS    Initial Discharge Plan? (Note: please see concurrent daily documentation for any updates after initial note).     HOME W/FAMILY AND \"[facility]\"    The Patient and/or patient representative: PATIENT was provided with choice of any post-acute providers for care and equipment and agrees with discharge plan  Yes    Electronically signed by Lydia Bermeo RN on 1/10/2021 at 12:35 PM

## 2021-01-10 NOTE — PROGRESS NOTES
2100 pt laying in bed, awake. Avasys is in room. Pt is calm and cooperative. meds given per mar. Pt denies any pain. Pt denies any needs at this time. Call light within reach. Will continue to monitor. Electronically signed by Cleo Truong RN on 1/9/2021 at 9:19 PM      5599 pt was a prn sitter last night. We used the avasys and he did get up to use the bathroom. He did sometimes listen to avasys to wait for help. meds given this morning. Pt up with 1 assist to bathroom, unsteady on feet. Call light within reach. Will continue to monitor.      Electronically signed by Cleo Truong RN on 1/10/2021 at 6:45 AM

## 2021-01-10 NOTE — PROGRESS NOTES
Assessment completed this shift. Alert and oriented x4. Denies pain or nausea. Up with standby assist. Slightly unsteady when standing up. To BR with wife and PCA. AVASYS for safety. CIWA score 1. No other needs at present.   Electronically signed by Iman Gil RN on 1/10/2021 at 11:48 AM

## 2021-01-10 NOTE — PROGRESS NOTES
Patient able to get up with steady gait. AVASYS d/c'd per Dr Sruthi Reilly.   Electronically signed by Mike Sutton RN on 1/10/2021 at 4:24 PM

## 2021-01-10 NOTE — PROGRESS NOTES
Hospitalist Daily Progress Note  Name: Paradise Han  Age: 38 y.o. Gender: male  CodeStatus: Full Code  Allergies: No Known Allergies    Chief Complaint:Altered Mental Status    Primary Care Provider: No primary care provider on file. InpatientTreatment Team: Treatment Team: Attending Provider: Valdez Alvarez DO; Consulting Physician: Lucille Vann MD; Utilization Reviewer: Merrie Dancer, RN; Consulting Physician: Ludwin Soriano MD; Consulting Physician: Governor Antoinette MD; Utilization Reviewer: Fili Diego RN; LPN: Sandra Fink LPN; Patient Care Tech: Juanita Ortega; Registered Nurse: Asia Gramajo RN; Patient Care Tech: [employer]  Admission Date: 1/6/2021      Subjective: Patient is seen evaluated bedside. Na 125 today. Precedex has been discontinued he is tolerating Librium with alcohol withdrawal protocol. Awake alert behaviors improved. Afebrile. Vital stable. I/O last 3 completed shifts: In: 3030 [P.O.:480; I.V.:2550]  Out: 2300 [Urine:2300]  I/O this shift: In: 4400 [P.O.:525; I.V.:3875]  Out: -       Physical Exam  Constitutional:       Comments: Lethargic, awake alert and oriented   HENT:      Head: Normocephalic and atraumatic. Nose: Nose normal.      Mouth/Throat:      Mouth: Mucous membranes are moist.      Pharynx: Oropharynx is clear. Eyes:      Extraocular Movements: Extraocular movements intact. Pupils: Pupils are equal, round, and reactive to light. Comments: Pupils dilated bilaterally   Neck:      Musculoskeletal: Normal range of motion. Cardiovascular:      Rate and Rhythm: Normal rate. Heart sounds: No murmur. No friction rub. No gallop. Pulmonary:      Effort: No respiratory distress. Breath sounds: No wheezing, rhonchi or rales. Abdominal:      General: There is no distension. Tenderness: There is no abdominal tenderness. There is no guarding. Musculoskeletal: Normal range of motion. Right lower leg: No edema. Left lower leg: No edema. Neurological:      Comments: Awake alert and oriented. No focal deficits   Psychiatric:         Mood and Affect: Mood normal.         Thought Content: Thought content normal.         Judgment: Judgment normal.      Comments: Unable to assess psychiatric condition given acute metabolic encephalopathy         Review of Systems  14 point ROS is reviewed and negative except for as above. Medications:  Reviewed    Infusion Medications:     Scheduled Medications:    amoxicillin-clavulanate  1 tablet Oral 2 times per day    guaiFENesin  600 mg Oral BID    enoxaparin  40 mg Subcutaneous Daily    thiamine  100 mg Oral Daily    folic acid  1 mg Oral Daily    propranolol  20 mg Oral BID    levetiracetam  500 mg Intravenous Q12H    chlordiazePOXIDE  20 mg Oral TID    sodium chloride flush  10 mL Intravenous 2 times per day    multivitamin  1 tablet Oral Daily     PRN Meds: labetalol **OR** labetalol, sodium chloride flush, promethazine **OR** ondansetron, polyethylene glycol, acetaminophen **OR** acetaminophen, LORazepam **OR** LORazepam **OR** LORazepam **OR** LORazepam **OR** LORazepam **OR** LORazepam **OR** LORazepam **OR** LORazepam    Labs:   Recent Labs     01/07/21 0455 01/08/21 0338 01/09/21  0551   WBC 7.6 5.8 4.6*   HGB 13.5* 14.2 13.1*   HCT 38.4* 42.1 37.1*   * 116* 130     Recent Labs     01/08/21  2308 01/09/21  0551 01/09/21  1058 01/09/21  1505   * 125*  126*  125* 125*  --    K 3.2* 3.7  3.7  3.7 3.3*  --    CL 91* 91*  90*  90* 89*  --    CO2 24 23  22  21 24  --    BUN 7 5*  6  6 5*  --    CREATININE 0.59* 0.52*  0.62*  0.63* 0.63*  --    CALCIUM 8.3* 8.4*  8.7  8.6 8.4*  --    PHOS 3.3 3.4 2.6 2.9     Recent Labs     01/07/21  0455 01/08/21  0338 01/09/21  0551   * 245* 382*   ALT 37 42* 55*   BILITOT 1.1* 0.8* 0.8*   ALKPHOS 71 62 62     No results for input(s): INR in the last 72 hours.   No results for input(s): Lilia Morales in the last 72 hours. Urinalysis:   Lab Results   Component Value Date    NITRU Negative 01/06/2021    WBCUA 0-2 01/06/2021    BACTERIA Negative 01/06/2021    RBCUA 3-5 01/06/2021    BLOODU SMALL 01/06/2021    SPECGRAV 1.004 01/06/2021    GLUCOSEU Negative 01/06/2021       Radiology:   Most recent    Chest CT      WITH CONTRAST:No results found for this or any previous visit. WITHOUT CONTRAST: No results found for this or any previous visit. CXR      2-view: No results found for this or any previous visit. Portable:   Results for orders placed during the hospital encounter of 01/06/21   XR CHEST PORTABLE    Narrative EXAMINATION: CHEST PORTABLE VIEW     CLINICAL HISTORY: NG tube placement    COMPARISONS: January 7, 2021 0910 hours     FINDINGS:    A total of 3 supine portable films are submitted. On the final third image the nasogastric tube now lies within the stomach. .  The cardiac silhouette is borderline enlarged. Pulmonary vascular unremarkable. Right sided trachea. Small area of atelectasis, patchy infiltrate left lower lobe. No Pneumothoraces. Impression ON THE THIRD IMAGE NASOGASTRIC TUBE IS WITHIN THE STOMACH. SMALL AREA OF ATELECTASIS, PATCHY INFILTRATE LEFT LOWER LOBE         Echo No results found for this or any previous visit. Assessment/Plan:    Active Hospital Problems    Diagnosis Date Noted    Hyponatremia [E87.1] 01/06/2021    Altered mental status [R41.82] 01/06/2021    Laceration of head without foreign body [S01.91XA] 01/06/2021    Alcohol use [Z72.89] 01/06/2021     Severe alcoholic DT with acute encephalopathy: Continue scheduled Librium, Ativan per alcohol withdrawal protocol. Continue thiamine and folate supplementation. Alcohol cessation education. Keppra added per neurology. Hyponatremia with potential seizure: Sodium up to 125   Nephrology following.   IV fluids

## 2021-01-10 NOTE — PROGRESS NOTES
INPATIENT PROGRESS NOTES    PATIENT NAME: Gonzalez Hernandes  MRN: [id number]  SERVICE DATE:  January 10, 2021   SERVICE TIME:  3:24 PM      PRIMARY SERVICE: Pulmonary Disease    CHIEF COMPLAIN: Delirium tremens    INTERVAL HPI: Patient seen and examined at bedside, Interval Notes, orders reviewed. Nursing notes noted  He is feeling good. No complaint of shortness of breath. No chest pain. No nausea vomiting diarrhea. Oxygen saturation on room air is 100%. His sodium improved to 133 today     OBJECTIVE    Body mass index is 23.64 kg/m². PHYSICAL EXAM:  Vitals:  /73   Pulse 75   Temp 97.9 °F (36.6 °C) (Oral)   Resp 18   Ht 6' 1\" (1.854 m)   Wt 179 lb 3.2 oz (81.3 kg)   SpO2 100%   BMI 23.64 kg/m²   General: Alert, awake . comfortable in bed, No distress. Head: Atraumatic , Normocephalic   Eyes: PERRL. No sclera icterus. No conjunctival injection. No discharge   ENT: No nasal  discharge. Pharynx clear. lips, teeth, mucosa and gums are normal, tongue protrudes in the midline  Neck:  Trachea midline. No thyromegaly, no JVD, No cervical adenopathy. Chest : Bilaterally symmetrical ,Normal effort,  No accessory muscle use  Lung : . Fair BS bilateral, decreased BS at bases. No Rales. No wheezing. No rhonchi. Heart[de-identified] Normal  rate. Regular rhythm. No mumur ,  Rub or gallop  ABD: Non-tender. Non-distended. No masses. No organmegaly. Normal bowel sounds. No hernia.   Ext : No Pitting both leg , No Cyanosis No clubbing  Neuro: no focal weakness          DATA:   Recent Labs     01/09/21  0551 01/10/21  0454   WBC 4.6* 4.0*   HGB 13.1* 13.8*   HCT 37.1* 39.3*   MCV 90.0 89.9    170     Recent Labs     01/09/21  0551 01/09/21  1058 01/10/21  0454   *  126*  125* 125* 133*   K 3.7  3.7  3.7 3.3* 3.5   CL 91*  90*  90* 89* 93*   CO2 23  22  21 24 27   BUN 5*  6  6 5* 6   CREATININE 0.52*  0.62*  0.63* 0.63* 0.65*   GLUCOSE 90  96  97 92 92   CALCIUM 8.4*  8.7  8.6 8.4* 9.1   PROT 6.0*  -- are essentially clear. NO ACUTE INTRACRANIAL PROCESS IDENTIFIED. Ct Cervical Spine Wo Contrast    Result Date: 1/7/2021  CT CERVICAL SPINE WO CONTRAST CLINICAL HISTORY:  trauma COMPARISON: NONE Findings: Multiple serial axial images of the cervical spine from the base of the skull through the upper thoracic vertebra with both sagittal and coronal reconstructions was performed. There is straightening of the normal expected cervical lordosis. There is multilevel degenerative joint disease. Prevertebral  soft tissues are  unremarkable. The disk spaces are intact No acute fractures or spondylo-listhesis. .     NO ACUTE FRACTURES. All CT scans at this facility use dose modulation, iterative reconstruction, and/or weight based dosing when appropriate to reduce radiation dose to as low as reasonably achievable. Xr Chest Portable    Result Date: 1/7/2021  EXAMINATION: CHEST PORTABLE VIEW  CLINICAL HISTORY: Confusion COMPARISONS: None  FINDINGS: Single  views of the chest is submitted. Limited exam due to low lung volume. The cardiac silhouette is enlarged Pulmonary vascular unremarkable. Right sided trachea. Infiltrate consolidation left lower lobe with trace left pleural effusion. No Pneumothoraces. INFILTRATE CONSOLIDATION LEFT LOWER LOBE WITH TRACE LEFT PLEURAL EFFUSION    Xr Chest Portable    Result Date: 1/7/2021  EXAMINATION: CHEST PORTABLE VIEW  CLINICAL HISTORY: NG tube placement COMPARISONS: January 7, 2021 0910 hours  FINDINGS: A total of 3 supine portable films are submitted. On the final third image the nasogastric tube now lies within the stomach. .  The cardiac silhouette is borderline enlarged. Pulmonary vascular unremarkable. Right sided trachea. Small area of atelectasis, patchy infiltrate left lower lobe. No Pneumothoraces.                                                                                    ON THE THIRD IMAGE NASOGASTRIC TUBE IS WITHIN THE STOMACH. SMALL AREA OF ATELECTASIS, PATCHY INFILTRATE LEFT LOWER LOBE     IMPRESSION AND SUGGESTION:  1. Acute encephalopathy, resolved  2. Delirium tremens, improved  3. Probable alcohol withdrawal seizure  4. Aspiration pneumonia  5. Hyponatremia, improving    IV antibiotic changed to p.o. Augmentin. He is not having any cough or shortness of breath. Chest x-ray showed left lower lobe consolidation. Discharge when okay by attending.       Electronically signed by Pablo Jarvis MD, [facility] on 1/10/2021 at 3:24 PM

## 2021-01-10 NOTE — PROGRESS NOTES
nausea and vomiting. Musculoskeletal: Negative. Skin: Negative for color change. Neurological: Positive for weakness. Negative for dizziness, tremors, seizures, syncope, facial asymmetry, speech difficulty, light-headedness, numbness and headaches. Psychiatric/Behavioral: Negative for hallucinations and self-injury. no confusion  Physical Exam  Vitals signs and nursing note reviewed. Constitutional:       Appearance: Normal appearance. HENT:      Head: Normocephalic and atraumatic. Eyes:      Extraocular Movements: Extraocular movements intact and EOM normal.      Conjunctiva/sclera: Conjunctivae normal.   Cardiovascular:      Rate and Rhythm: Normal rate and regular rhythm. Pulses: Normal pulses. Heart sounds: Normal heart sounds. Pulmonary:      Effort: Pulmonary effort is normal.      Breath sounds: Normal breath sounds. Musculoskeletal: Normal range of motion. Skin:     General: Skin is warm and dry. Neurological:      General: No focal deficit present. Mental Status: He is alert and oriented to person, place, and time. Coordination: Finger-Nose-Finger Test and Romberg Test normal.      Gait: Gait is intact. Psychiatric:         Mood and Affect: Mood normal.         Speech: Speech normal.         Behavior: Behavior normal.       Neurologic Exam     Mental Status   Oriented to person, place, and time. Speech: speech is normal     Cranial Nerves     CN III, IV, VI   Extraocular motions are normal.     CN VII   Facial expression full, symmetric.      Motor Exam   Muscle bulk: normalStrength 5/5 throughout     Gait, Coordination, and Reflexes     Gait  Gait: normal    Coordination   Romberg: negative  Finger to nose coordination: normal    Mild tremors      Medications:  Reviewed    Infusion Medications:   Scheduled Medications:    chlordiazePOXIDE  5 mg Oral TID    amoxicillin-clavulanate  1 tablet Oral 2 times per day    guaiFENesin  600 mg Oral BID    enoxaparin visit. No results found for this or any previous visit. CT of the Head:   Results for orders placed during the hospital encounter of 01/06/21   CT Head WO Contrast    Narrative CT HEAD WO CONTRAST : 1/6/2021    CLINICAL HISTORY:  ams . COMPARISON: None available. TECHNIQUE: Spiral unenhanced images were obtained of the head, with routine multiplanar reconstructions performed. All CT scans at this facility use dose modulation, iterative reconstruction, and/or weight based dosing when appropriate to reduce radiation dose to as low as reasonably achievable. FINDINGS:    There is no intracranial hemorrhage, mass effect, midline shift, extra-axial collection, evidence of hydrocephalus, recent ischemic infarct, or skull fracture identified. There is no significant atrophy or white matter changes, for age. The mastoid air cells and visualized paranasal sinuses are essentially clear. Impression NO ACUTE INTRACRANIAL PROCESS IDENTIFIED. No results found for this or any previous visit. No results found for this or any previous visit. Carotid duplex: No results found for this or any previous visit. No results found for this or any previous visit. No results found for this or any previous visit. Echo No results found for this or any previous visit. Assessment/Plan:  Probable generalized seizure secondary to alcohol withdrawal.  The patient is an active withdrawal and delirium.  Patient is tremulous on top.  Patient should be covered with Keppra for a few days.  Patient should be on WA protocol recommended propranolol for tachycardia as this is part of the withdrawal.  CT scan of the head is normal     Patient continues to improve. Orthostatics are negative and patient reports less dizziness upon standing. Hyponatremia has also improved. Sodium is now 133. No seizure activity reported and Keppra does not need to be continued.     Patient stable from a neurological perspective and okay for discharge    I have personally performed a face to face diagnostic evaluation on this patient, reviewed all data and investigations, and am the sole provider of all clinical decisions on the neurological status of this patient. pts exam shows he is better, mild tremors on outstretched hand      aGbriel Rich MD, Manuel Reyes, American Board of Psychiatry & Neurology  Board Certified in Vascular Neurology  Board Certified in Neuromuscular Medicine  Certified in Neurorehabilitation                        Collaborating physicians: Dr Danyelle Rich    Electronically signed by Cinthia Miranda PA-C on 1/10/2021 at 1:23 PM

## 2021-01-10 NOTE — PROGRESS NOTES
Renal Progress Note    Assessment:  38y.o. year old male with history s/f EtoH use who presented with AMS.      1. Severe hyponatremia: possibly in setting of etoH withdrawal based on timing of last drink. Could also have chronic hyponatremia as well in setting of low solute (low BUN and Scr s/o this). Rapid improvement w/ 3% NS  2. EtOH abuse/withdrawal: on CIWA protol, on precedex  3. Transaminitis: 2/2 EtoH  4. HTN  5.  Encephalopathy: likely due to EtOH withdrawal      Plan:    Discussed with patient moderation with this and drinks we will sign off thank you so much for your referral please do not hesitate to call as needed no further recommendation except if drinking beer which is the patient preferable drink not to exceed 2-3 bottle and incorporate pretzel  -Patient Active Problem List:     Hyponatremia     Altered mental status     Laceration of head without foreign body     Alcohol use      Subjective:   Admit Date: 1/6/2021    Interval History: Patient seen and examined uneventful night expressed no complain denied any organ system related symptoms    Medications:   Scheduled Meds:   chlordiazePOXIDE  5 mg Oral TID    amoxicillin-clavulanate  1 tablet Oral 2 times per day    guaiFENesin  600 mg Oral BID    enoxaparin  40 mg Subcutaneous Daily    thiamine  100 mg Oral Daily    folic acid  1 mg Oral Daily    propranolol  20 mg Oral BID    levetiracetam  500 mg Intravenous Q12H    sodium chloride flush  10 mL Intravenous 2 times per day    multivitamin  1 tablet Oral Daily     Continuous Infusions:    CBC:   Recent Labs     01/09/21  0551 01/10/21  0454   WBC 4.6* 4.0*   HGB 13.1* 13.8*    170     CMP:    Recent Labs     01/09/21  1058 01/10/21  0454 01/10/21  1528   * 133* 135   K 3.3* 3.5 3.8   CL 89* 93* 96   CO2 24 27 27   BUN 5* 6 8   CREATININE 0.63* 0.65* 0.85   GLUCOSE 92 92 119*   CALCIUM 8.4* 9.1 8.9   LABGLOM >60.0 >60.0 >60.0     Troponin: No results for input(s): TROPONINI

## 2021-01-10 NOTE — PROGRESS NOTES
Hospitalist Daily Progress Note  Name: Tonya Armando  Age: 38 y.o. Gender: male  CodeStatus: Full Code  Allergies: No Known Allergies    Chief Complaint:Altered Mental Status    Primary Care Provider: No primary care provider on file. InpatientTreatment Team: Treatment Team: Attending Provider: Crys Iqbal DO; Consulting Physician: Jackie Lujan MD; Utilization Reviewer: Fredi Boyle RN; Consulting Physician: Pierre Garcia MD; Consulting Physician: Monty Green MD; Utilization Reviewer: Yoly Carpenter RN; : Real Orozco RN; Registered Nurse: Krish Giraldo, WILMA; LPN: Azam Johansen LPN  Admission Date: 1/6/2021      Subjective: Patient is seen evaluated bedside. Hyponatremia improved to 133 today. Librium decreased to 5 mg 3 times daily. Awake alert vital stable no acute concerns. Physical Exam  Constitutional:       Comments: Lethargic, awake alert and oriented   HENT:      Head: Normocephalic and atraumatic. Nose: Nose normal.      Mouth/Throat:      Mouth: Mucous membranes are moist.      Pharynx: Oropharynx is clear. Eyes:      Extraocular Movements: Extraocular movements intact. Pupils: Pupils are equal, round, and reactive to light. Comments: Pupils dilated bilaterally   Neck:      Musculoskeletal: Normal range of motion. Cardiovascular:      Rate and Rhythm: Normal rate. Heart sounds: No murmur. No friction rub. No gallop. Pulmonary:      Effort: No respiratory distress. Breath sounds: No wheezing, rhonchi or rales. Abdominal:      General: There is no distension. Tenderness: There is no abdominal tenderness. There is no guarding. Musculoskeletal: Normal range of motion. Right lower leg: No edema. Left lower leg: No edema. Neurological:      Comments: Awake alert and oriented. No focal deficits   Psychiatric:         Mood and Affect: Mood normal.         Thought Content:  Thought content normal. Judgment: Judgment normal.      Comments: Unable to assess psychiatric condition given acute metabolic encephalopathy         Review of Systems  14 point ROS is reviewed and negative except for as above. Medications:  Reviewed    Infusion Medications:     Scheduled Medications:    chlordiazePOXIDE  5 mg Oral TID    amoxicillin-clavulanate  1 tablet Oral 2 times per day    guaiFENesin  600 mg Oral BID    enoxaparin  40 mg Subcutaneous Daily    thiamine  100 mg Oral Daily    folic acid  1 mg Oral Daily    propranolol  20 mg Oral BID    levetiracetam  500 mg Intravenous Q12H    sodium chloride flush  10 mL Intravenous 2 times per day    multivitamin  1 tablet Oral Daily     PRN Meds: labetalol **OR** labetalol, sodium chloride flush, promethazine **OR** ondansetron, polyethylene glycol, acetaminophen **OR** acetaminophen, LORazepam **OR** LORazepam **OR** LORazepam **OR** LORazepam **OR** LORazepam **OR** LORazepam **OR** LORazepam **OR** LORazepam    Labs:   Recent Labs     01/08/21 0338 01/09/21 0551 01/10/21  0454   WBC 5.8 4.6* 4.0*   HGB 14.2 13.1* 13.8*   HCT 42.1 37.1* 39.3*   * 130 170     Recent Labs     01/09/21  0551 01/09/21  1058 01/09/21  1505 01/10/21  0454 01/10/21  1015   *  126*  125* 125*  --  133*  --    K 3.7  3.7  3.7 3.3*  --  3.5  --    CL 91*  90*  90* 89*  --  93*  --    CO2 23  22  21 24  --  27  --    BUN 5*  6  6 5*  --  6  --    CREATININE 0.52*  0.62*  0.63* 0.63*  --  0.65*  --    CALCIUM 8.4*  8.7  8.6 8.4*  --  9.1  --    PHOS 3.4 2.6 2.9 3.7 3.6     Recent Labs     01/08/21 0338 01/09/21  0551 01/10/21  0454   * 382* 437*   ALT 42* 55* 75*   BILITOT 0.8* 0.8* 0.5   ALKPHOS 62 62 68     No results for input(s): INR in the last 72 hours. No results for input(s): Teryl Drown in the last 72 hours.     Urinalysis:   Lab Results   Component Value Date    NITRU Negative 01/06/2021    WBCUA 0-2 01/06/2021    BACTERIA Negative 01/06/2021 RBCUA 3-5 01/06/2021    BLOODU SMALL 01/06/2021    SPECGRAV 1.004 01/06/2021    GLUCOSEU Negative 01/06/2021       Radiology:   Most recent    Chest CT      WITH CONTRAST:No results found for this or any previous visit. WITHOUT CONTRAST: No results found for this or any previous visit. CXR      2-view: No results found for this or any previous visit. Portable:   Results for orders placed during the hospital encounter of 01/06/21   XR CHEST PORTABLE    Narrative EXAMINATION: CHEST PORTABLE VIEW     CLINICAL HISTORY: NG tube placement    COMPARISONS: January 7, 2021 0910 hours     FINDINGS:    A total of 3 supine portable films are submitted. On the final third image the nasogastric tube now lies within the stomach. .  The cardiac silhouette is borderline enlarged. Pulmonary vascular unremarkable. Right sided trachea. Small area of atelectasis, patchy infiltrate left lower lobe. No Pneumothoraces. Impression ON THE THIRD IMAGE NASOGASTRIC TUBE IS WITHIN THE STOMACH. SMALL AREA OF ATELECTASIS, PATCHY INFILTRATE LEFT LOWER LOBE         Echo No results found for this or any previous visit. Assessment/Plan:    Active Hospital Problems    Diagnosis Date Noted    Hyponatremia [E87.1] 01/06/2021    Altered mental status [R41.82] 01/06/2021    Laceration of head without foreign body [S01.91XA] 01/06/2021    Alcohol use [Z72.89] 01/06/2021     Severe alcoholic DT with acute encephalopathy: Continue scheduled Librium, Ativan per alcohol withdrawal protocol. Continue thiamine and folate supplementation. Alcohol cessation education. Keppra added per neurology. Hyponatremia with potential seizure: Sodium improved, 133 today. Fluids discontinued    Possible aspiration: augmentin total of 3 more days    DVT prophylaxis held due to head injury.   lovenox sq    Additional work up or/and treatment plan may be added today or then after based on clinical progression. I am managing a portion of pt care. Some medical issues are handled byother specialists. Additional work up and treatment should be done in out pt setting by pt PCP and other out pt providers. In addition to examining and evaluating pt, I spent additional time explaining care, normaland abnormal findings, and treatment plan. All of pt questions were answered. Counseling, diet and education were provided. Case will be discussed with nursing staff when appropriate. Family will be updated if and whenappropriate.       Electronically signed by Meagan Vincent DO on 1/10/2021 at 3:27 PM

## 2021-01-11 VITALS
HEIGHT: 73 IN | WEIGHT: 179.2 LBS | DIASTOLIC BLOOD PRESSURE: 70 MMHG | TEMPERATURE: 98.4 F | RESPIRATION RATE: 17 BRPM | HEART RATE: 64 BPM | BODY MASS INDEX: 23.75 KG/M2 | SYSTOLIC BLOOD PRESSURE: 119 MMHG | OXYGEN SATURATION: 100 %

## 2021-01-11 LAB
ALBUMIN SERPL-MCNC: 3.8 G/DL (ref 3.5–4.6)
ALP BLD-CCNC: 65 U/L (ref 35–104)
ALT SERPL-CCNC: 76 U/L (ref 0–41)
ANION GAP SERPL CALCULATED.3IONS-SCNC: 12 MEQ/L (ref 9–15)
ANION GAP SERPL CALCULATED.3IONS-SCNC: 13 MEQ/L (ref 9–15)
AST SERPL-CCNC: 301 U/L (ref 0–40)
BASOPHILS ABSOLUTE: 0 K/UL (ref 0–0.2)
BASOPHILS RELATIVE PERCENT: 0.8 %
BILIRUB SERPL-MCNC: 0.5 MG/DL (ref 0.2–0.7)
BUN BLDV-MCNC: 6 MG/DL (ref 6–20)
BUN BLDV-MCNC: 7 MG/DL (ref 6–20)
CALCIUM SERPL-MCNC: 9.3 MG/DL (ref 8.5–9.9)
CALCIUM SERPL-MCNC: 9.3 MG/DL (ref 8.5–9.9)
CHLORIDE BLD-SCNC: 101 MEQ/L (ref 95–107)
CHLORIDE BLD-SCNC: 99 MEQ/L (ref 95–107)
CO2: 25 MEQ/L (ref 20–31)
CO2: 26 MEQ/L (ref 20–31)
CREAT SERPL-MCNC: 0.71 MG/DL (ref 0.7–1.2)
CREAT SERPL-MCNC: 0.74 MG/DL (ref 0.7–1.2)
EOSINOPHILS ABSOLUTE: 0.1 K/UL (ref 0–0.7)
EOSINOPHILS RELATIVE PERCENT: 2.8 %
GFR AFRICAN AMERICAN: >60
GFR AFRICAN AMERICAN: >60
GFR NON-AFRICAN AMERICAN: >60
GFR NON-AFRICAN AMERICAN: >60
GLOBULIN: 2.9 G/DL (ref 2.3–3.5)
GLUCOSE BLD-MCNC: 93 MG/DL (ref 70–99)
GLUCOSE BLD-MCNC: 95 MG/DL (ref 70–99)
HCT VFR BLD CALC: 40.7 % (ref 42–52)
HEMOGLOBIN: 14 G/DL (ref 14–18)
LYMPHOCYTES ABSOLUTE: 1 K/UL (ref 1–4.8)
LYMPHOCYTES RELATIVE PERCENT: 22.5 %
MCH RBC QN AUTO: 31.4 PG (ref 27–31.3)
MCHC RBC AUTO-ENTMCNC: 34.3 % (ref 33–37)
MCV RBC AUTO: 91.7 FL (ref 80–100)
MONOCYTES ABSOLUTE: 1 K/UL (ref 0.2–0.8)
MONOCYTES RELATIVE PERCENT: 23.8 %
NEUTROPHILS ABSOLUTE: 2.1 K/UL (ref 1.4–6.5)
NEUTROPHILS RELATIVE PERCENT: 50.1 %
PDW BLD-RTO: 13.2 % (ref 11.5–14.5)
PHOSPHORUS: 4 MG/DL (ref 2.3–4.8)
PHOSPHORUS: 4.3 MG/DL (ref 2.3–4.8)
PLATELET # BLD: 205 K/UL (ref 130–400)
POTASSIUM REFLEX MAGNESIUM: 3.7 MEQ/L (ref 3.4–4.9)
POTASSIUM SERPL-SCNC: 3.7 MEQ/L (ref 3.4–4.9)
RBC # BLD: 4.44 M/UL (ref 4.7–6.1)
SODIUM BLD-SCNC: 137 MEQ/L (ref 135–144)
SODIUM BLD-SCNC: 139 MEQ/L (ref 135–144)
TOTAL PROTEIN: 6.7 G/DL (ref 6.3–8)
WBC # BLD: 4.3 K/UL (ref 4.8–10.8)

## 2021-01-11 PROCEDURE — 6360000002 HC RX W HCPCS: Performed by: PSYCHIATRY & NEUROLOGY

## 2021-01-11 PROCEDURE — 6370000000 HC RX 637 (ALT 250 FOR IP): Performed by: PSYCHIATRY & NEUROLOGY

## 2021-01-11 PROCEDURE — APPSS30 APP SPLIT SHARED TIME 16-30 MINUTES: Performed by: NURSE PRACTITIONER

## 2021-01-11 PROCEDURE — 36415 COLL VENOUS BLD VENIPUNCTURE: CPT

## 2021-01-11 PROCEDURE — 99232 SBSQ HOSP IP/OBS MODERATE 35: CPT | Performed by: INTERNAL MEDICINE

## 2021-01-11 PROCEDURE — 6370000000 HC RX 637 (ALT 250 FOR IP): Performed by: INTERNAL MEDICINE

## 2021-01-11 PROCEDURE — 99233 SBSQ HOSP IP/OBS HIGH 50: CPT | Performed by: PSYCHIATRY & NEUROLOGY

## 2021-01-11 PROCEDURE — 84100 ASSAY OF PHOSPHORUS: CPT

## 2021-01-11 PROCEDURE — 2580000003 HC RX 258: Performed by: PSYCHIATRY & NEUROLOGY

## 2021-01-11 PROCEDURE — 85025 COMPLETE CBC W/AUTO DIFF WBC: CPT

## 2021-01-11 PROCEDURE — 80053 COMPREHEN METABOLIC PANEL: CPT

## 2021-01-11 PROCEDURE — 6360000002 HC RX W HCPCS: Performed by: INTERNAL MEDICINE

## 2021-01-11 PROCEDURE — 6370000000 HC RX 637 (ALT 250 FOR IP): Performed by: NURSE PRACTITIONER

## 2021-01-11 RX ORDER — MULTIVITAMIN WITH IRON
1 TABLET ORAL DAILY
Qty: 30 TABLET | Refills: 0 | Status: SHIPPED | OUTPATIENT
Start: 2021-01-12 | End: 2021-02-11

## 2021-01-11 RX ORDER — LANOLIN ALCOHOL/MO/W.PET/CERES
100 CREAM (GRAM) TOPICAL DAILY
Qty: 30 TABLET | Refills: 3 | Status: SHIPPED | OUTPATIENT
Start: 2021-01-12

## 2021-01-11 RX ORDER — DISULFIRAM 250 MG/1
250 TABLET ORAL DAILY
Qty: 30 TABLET | Refills: 2 | Status: SHIPPED | OUTPATIENT
Start: 2021-01-11 | End: 2021-01-18 | Stop reason: CLARIF

## 2021-01-11 RX ORDER — FOLIC ACID 1 MG/1
1 TABLET ORAL DAILY
Qty: 30 TABLET | Refills: 3 | Status: SHIPPED | OUTPATIENT
Start: 2021-01-12

## 2021-01-11 RX ORDER — AMOXICILLIN AND CLAVULANATE POTASSIUM 875; 125 MG/1; MG/1
1 TABLET, FILM COATED ORAL EVERY 12 HOURS SCHEDULED
Qty: 6 TABLET | Refills: 0 | Status: SHIPPED | OUTPATIENT
Start: 2021-01-11 | End: 2021-01-14

## 2021-01-11 RX ADMIN — ENOXAPARIN SODIUM 40 MG: 40 INJECTION, SOLUTION INTRAVENOUS; SUBCUTANEOUS at 09:12

## 2021-01-11 RX ADMIN — THERA TABS 1 TABLET: TAB at 09:12

## 2021-01-11 RX ADMIN — AMOXICILLIN AND CLAVULANATE POTASSIUM 1 TABLET: 875; 125 TABLET, FILM COATED ORAL at 09:12

## 2021-01-11 RX ADMIN — PROPRANOLOL HYDROCHLORIDE 20 MG: 20 TABLET ORAL at 09:12

## 2021-01-11 RX ADMIN — GUAIFENESIN 600 MG: 600 TABLET ORAL at 09:12

## 2021-01-11 RX ADMIN — LEVETIRACETAM 500 MG: 100 INJECTION, SOLUTION INTRAVENOUS at 03:33

## 2021-01-11 RX ADMIN — CHLORDIAZEPOXIDE HYDROCHLORIDE 5 MG: 5 CAPSULE ORAL at 09:12

## 2021-01-11 RX ADMIN — FOLIC ACID 1 MG: 1 TABLET ORAL at 09:12

## 2021-01-11 ASSESSMENT — ENCOUNTER SYMPTOMS
WHEEZING: 0
CHEST TIGHTNESS: 0
COLOR CHANGE: 0
COUGH: 0
VOMITING: 0
SHORTNESS OF BREATH: 0
NAUSEA: 0
TROUBLE SWALLOWING: 0

## 2021-01-11 NOTE — CARE COORDINATION
This Care Transition Nurse provided pneumonia booklet and zones sheet and reviewed. Discussed the use of zones sheet. Stressed importance of phoning physician promptly for symptoms in the yellow zone and ems for symptoms in the red zone. Discussed importance of taking antibiotics until gone, drinking plenty of fluids ,especially water, coughing and deep breathing, continue to use incentive spirometer and acapella at home,  get adequate rest and eat a well balanced diet, good handwashing, masking, avoiding ill contacts, disposing of used tissues in the proper receptacle, avoid drinking alcoholic beverages, vaccines. Stressed  importance of physician follow up within one week of discharge. Patient voiced understanding. Patient is not sure if he has pneumonia. He does not have any respiratory symptoms. Discussed physician's note stating possible aspiration pneumonia. He will read the information and use zones sheet as appropriate.

## 2021-01-11 NOTE — DISCHARGE SUMMARY
Physician Discharge Summary     Patient ID:  Manuel Hughes  [id number]  11 y.o.  1985    Admit date: 1/6/2021    Discharge date : 01/11/21     Admitting Physician: Karlo Pisano MD     Discharge Physician: Frank Metcalf DO     Admission Diagnoses: Hyponatremia [E87.1]    Discharge Diagnoses: Hyponatremia, delirium tremens 2/2 etoh withdrawal    Admission Condition: serious    Discharged Condition: good    Hospital Course: 38 y.o. male with a history of alcohol abuse presents with altered mental status. Patient was found on the floor altered with blood around him. Questionable seizures. On presentation to the ER, patient was not lethargic and confused. He was found to have severe hyponatremia. .  Patient drinks on a daily basis and usually drinks beer. It was reported that his last drink was 1/1/2021. Pt admitted to the ICU and hydrated with IVF. Started on thiamine, folic acid and MV supplementation. Nephrology and neurology consulted. Pt started on Keppra for possible seizures, which were likely from his hyponatremia. Pt was on a precedex drip due to severe DT's and was able to be weaned off. Librium was started and CIWA put in place. Pt continued to improve. States that his new years resolution was to stop drinking so he did, but states that this was his first attempt at quitting and he had no idea that he could have this serious of withdrawals. Pt given resources for counseling. Electrolytes improved to normal and patient was no longer requiring ativan and was stable for discharge home on 1/11 in stable and improved condition. Pt very motivated to stop drinking and discharged home with thiamine, folic acid and MV. Pt to follow up with PCP after discharge. Pt was treated for presumed aspiration pneumonia and will complete 3 more days of Augmentin to complete course. Consults: pulmonary/intensive care and nephrology      Discharge Exam:  Constitutional: Awake and alert in no acute distress.  Lying in bed comfortably  Head: Normocephalic, atraumatic  Eyes: EOMI, PERRLA  ENT: moist mucous membranes  Neck: neck supple, trachea midline  Lungs: Good inspiratory effort, CTABL, no wheeze, no rhonchi, no rales  Heart: RRR, normal S1 and S2, no murmurs  GI: Soft, non-distended, non tender, no guarding, no rebound, +BS  MSK: Full ROM bilaterally, 5/5 strength bilaterally, no edema noted  Pulses: 2+ pulses bilaterally  Skin: warm, dry, good turgor, without lesions or rashes  Neuro: Intact motor and sensory, no focal deficits  Psych: appropriate affect       Labs:   Recent Labs     01/09/21  0551 01/10/21  0454 01/11/21  0605   WBC 4.6* 4.0* 4.3*   HGB 13.1* 13.8* 14.0   HCT 37.1* 39.3* 40.7*    170 205     Recent Labs     01/10/21  1528 01/10/21  2105 01/11/21  0247 01/11/21  0604 01/11/21  0846     --  137 139  --    K 3.8  --  3.7 3.7  --    CL 96  --  99 101  --    CO2 27  --  26 25  --    BUN 8  --  7 6  --    CREATININE 0.85  --  0.74 0.71  --    CALCIUM 8.9  --  9.3 9.3  --    PHOS 3.5 4.0 4.3  --  4.0     Recent Labs     01/09/21  0551 01/10/21  0454 01/11/21  0604   * 437* 301*   ALT 55* 75* 76*   BILITOT 0.8* 0.5 0.5   ALKPHOS 62 68 65     No results for input(s): INR in the last 72 hours. No results for input(s): Josy Gaster in the last 72 hours. Urinalysis:   Lab Results   Component Value Date    NITRU Negative 01/06/2021    WBCUA 0-2 01/06/2021    BACTERIA Negative 01/06/2021    RBCUA 3-5 01/06/2021    BLOODU SMALL 01/06/2021    SPECGRAV 1.004 01/06/2021    GLUCOSEU Negative 01/06/2021       Radiology:   Most recent    Chest CT      WITH CONTRAST:No results found for this or any previous visit. WITHOUT CONTRAST: No results found for this or any previous visit. CXR      2-view: No results found for this or any previous visit.      Portable:   Results for orders placed during the hospital encounter of 01/06/21   XR CHEST PORTABLE    Narrative EXAMINATION: CHEST PORTABLE VIEW CLINICAL HISTORY: NG tube placement    COMPARISONS: January 7, 2021 0910 hours     FINDINGS:    A total of 3 supine portable films are submitted. On the final third image the nasogastric tube now lies within the stomach. .  The cardiac silhouette is borderline enlarged. Pulmonary vascular unremarkable. Right sided trachea. Small area of atelectasis, patchy infiltrate left lower lobe. No Pneumothoraces. Impression ON THE THIRD IMAGE NASOGASTRIC TUBE IS WITHIN THE STOMACH. SMALL AREA OF ATELECTASIS, PATCHY INFILTRATE LEFT LOWER LOBE         Echo No results found for this or any previous visit. Disposition: home    In process/preliminary results:  Outstanding Order Results     Date and Time Order Name Status Description    1/7/2021 1052 Culture, Blood 2 Preliminary     1/7/2021 1052 Culture, Blood 1 Preliminary           Patient Instructions:   Current Discharge Medication List      START taking these medications    Details   folic acid (FOLVITE) 1 MG tablet Take 1 tablet by mouth daily  Qty: 30 tablet, Refills: 3      Multiple Vitamin (MULTIVITAMIN) TABS tablet Take 1 tablet by mouth daily  Qty: 30 tablet, Refills: 0      amoxicillin-clavulanate (AUGMENTIN) 875-125 MG per tablet Take 1 tablet by mouth every 12 hours for 3 days  Qty: 6 tablet, Refills: 0      thiamine 100 MG tablet Take 1 tablet by mouth daily  Qty: 30 tablet, Refills: 3           Activity: activity as tolerated  Diet: regular diet  Wound Care: none needed    Follow-up with PCP in 2 weeks.     DC time 35 minutes    Signed:  Electronically signed by [facility] Lam on 1/11/2021 at 2:58 PM

## 2021-01-11 NOTE — PROGRESS NOTES
Patient assessed and vital signs are stable. Denies any pain or nausea. Scored a 0 on the CIWA scale. Patient states feels so much better. Patient is A&Ox4 and up independent in the room. Night time medications administered. Tolerated well. Denies any further needs at this time. Will continue to monitor. 0430 - Patient currently resting denies any further needs at this time.

## 2021-01-11 NOTE — PROGRESS NOTES
Nutrition Assessment    Type and Reason for Visit:  Reassess    Nutrition Recommendations/Plan: Continue with general diet    Nutrition Assessment:  Nutritional status adequate, mental status improved. Received < 24 hrs of TF, diet now advanced to General and intake is adequate. Weight is within UBW range, no further nutrition intervention indicated    Malnutrition Assessment:  Malnutrition Status:  No malnutrition    Context:  Social/Environmental Circumstances       Wounds:  None(abrasion back of head from fall))       Current Nutrition Therapies:    DIET GENERAL;     Anthropometric Measures:  · Height: 6' 1\" (185.4 cm)  · Current Body Weight: 179 lb (81.2 kg)   · Admission Body Weight: 175 lb (79.4 kg)(stated)    · Usual Body Weight: (n/a)     · Ideal Body Weight: 184 lbs;   · BMI: 23.6  · BMI Categories: Normal Weight (BMI 22.0 to 24.9) age over 72       Nutrition Diagnosis:   No nutrition diagnosis at this time     Nutrition Interventions:   Food and/or Nutrient Delivery:  Continue Current Diet  Nutrition Education/Counseling:  Education not indicated   Coordination of Nutrition Care:  No recommendation at this time    Goals:  po > 75%       Nutrition Monitoring and Evaluation:   Behavioral-Environmental Outcomes:  None Identified   Food/Nutrient Intake Outcomes:  None Identified  Physical Signs/Symptoms Outcomes:  None Identified     Discharge Planning:    No discharge needs at this time     Electronically signed by Cherelle Andrade RD, LD on 1/11/21 at 9:24 AM EST

## 2021-01-11 NOTE — PROGRESS NOTES
INPATIENT PROGRESS NOTES    PATIENT NAME: Shirin Del Rosario  MRN: [id number]  SERVICE DATE:  January 11, 2021   SERVICE TIME:  3:30 PM      PRIMARY SERVICE: Pulmonary Disease    CHIEF COMPLAIN: Delirium tremens    INTERVAL HPI: Patient seen and examined at bedside, Interval Notes, orders reviewed. Nursing notes noted  He is doing well and going home today. No complaint of shortness of breath. No chest pain. No nausea vomiting diarrhea. Oxygen saturation on room air is 100%. OBJECTIVE    Body mass index is 23.64 kg/m². PHYSICAL EXAM:  Vitals:  /70   Pulse 64   Temp 98.4 °F (36.9 °C) (Oral)   Resp 17   Ht 6' 1\" (1.854 m)   Wt 179 lb 3.2 oz (81.3 kg)   SpO2 100%   BMI 23.64 kg/m²   General: Alert, awake . comfortable in bed, No distress. Head: Atraumatic , Normocephalic   Eyes: PERRL. No sclera icterus. No conjunctival injection. No discharge   ENT: No nasal  discharge. Pharynx clear. lips, teeth, mucosa and gums are normal, tongue protrudes in the midline  Neck:  Trachea midline. No thyromegaly, no JVD, No cervical adenopathy. Chest : Bilaterally symmetrical ,Normal effort,  No accessory muscle use  Lung : . Fair BS bilateral, decreased BS at bases. No Rales. No wheezing. No rhonchi. Heart[de-identified] Normal  rate. Regular rhythm. No mumur ,  Rub or gallop  ABD: Non-tender. Non-distended. No masses. No organmegaly. Normal bowel sounds. No hernia.   Ext : No Pitting both leg , No Cyanosis No clubbing  Neuro: no focal weakness          DATA:   Recent Labs     01/10/21  0454 01/11/21  0605   WBC 4.0* 4.3*   HGB 13.8* 14.0   HCT 39.3* 40.7*   MCV 89.9 91.7    205     Recent Labs     01/10/21  0454 01/10/21  0454 01/11/21  0247 01/11/21  0604   *   < > 137 139   K 3.5   < > 3.7 3.7   CL 93*   < > 99 101   CO2 27   < > 26 25   BUN 6   < > 7 6   CREATININE 0.65*   < > 0.74 0.71   GLUCOSE 92   < > 95 93   CALCIUM 9.1   < > 9.3 9.3   PROT 6.7  --   --  6.7   LABALBU 3.8  --   --  3.8   BILITOT 0.5  -- --  0.5   ALKPHOS 68  --   --  65   *  --   --  301*   ALT 75*  --   --  76*   LABGLOM >60.0   < > >60.0 >60.0   GFRAA >60.0   < > >60.0 >60.0   GLOB 2.9  --   --  2.9    < > = values in this interval not displayed. MV Settings:          No results for input(s): PHART, PDY4LHF, PO2ART, EDT6APM, BEART, R5FQKJDJ in the last 72 hours. O2 Device: None (Room air)  O2 Flow Rate (L/min): 0 L/min    DIET GENERAL;     MEDICATIONS during current hospitalization:    Continuous Infusions:    Scheduled Meds:   chlordiazePOXIDE  5 mg Oral TID    amoxicillin-clavulanate  1 tablet Oral 2 times per day    guaiFENesin  600 mg Oral BID    enoxaparin  40 mg Subcutaneous Daily    thiamine  100 mg Oral Daily    folic acid  1 mg Oral Daily    propranolol  20 mg Oral BID    sodium chloride flush  10 mL Intravenous 2 times per day    multivitamin  1 tablet Oral Daily       PRN Meds:labetalol **OR** labetalol, sodium chloride flush, promethazine **OR** ondansetron, polyethylene glycol, acetaminophen **OR** acetaminophen, LORazepam **OR** LORazepam **OR** LORazepam **OR** LORazepam **OR** LORazepam **OR** LORazepam **OR** LORazepam **OR** LORazepam    Radiology  Ct Head Wo Contrast    Result Date: 1/7/2021  CT HEAD WO CONTRAST : 1/6/2021 CLINICAL HISTORY:  ams . COMPARISON: None available. TECHNIQUE: Spiral unenhanced images were obtained of the head, with routine multiplanar reconstructions performed. All CT scans at this facility use dose modulation, iterative reconstruction, and/or weight based dosing when appropriate to reduce radiation dose to as low as reasonably achievable. FINDINGS: There is no intracranial hemorrhage, mass effect, midline shift, extra-axial collection, evidence of hydrocephalus, recent ischemic infarct, or skull fracture identified. There is no significant atrophy or white matter changes, for age. The mastoid air cells and visualized paranasal sinuses are essentially clear.      NO ACUTE INTRACRANIAL PROCESS IDENTIFIED. Ct Cervical Spine Wo Contrast    Result Date: 1/7/2021  CT CERVICAL SPINE WO CONTRAST CLINICAL HISTORY:  trauma COMPARISON: NONE Findings: Multiple serial axial images of the cervical spine from the base of the skull through the upper thoracic vertebra with both sagittal and coronal reconstructions was performed. There is straightening of the normal expected cervical lordosis. There is multilevel degenerative joint disease. Prevertebral  soft tissues are  unremarkable. The disk spaces are intact No acute fractures or spondylo-listhesis. .     NO ACUTE FRACTURES. All CT scans at this facility use dose modulation, iterative reconstruction, and/or weight based dosing when appropriate to reduce radiation dose to as low as reasonably achievable. Xr Chest Portable    Result Date: 1/7/2021  EXAMINATION: CHEST PORTABLE VIEW  CLINICAL HISTORY: Confusion COMPARISONS: None  FINDINGS: Single  views of the chest is submitted. Limited exam due to low lung volume. The cardiac silhouette is enlarged Pulmonary vascular unremarkable. Right sided trachea. Infiltrate consolidation left lower lobe with trace left pleural effusion. No Pneumothoraces. INFILTRATE CONSOLIDATION LEFT LOWER LOBE WITH TRACE LEFT PLEURAL EFFUSION    Xr Chest Portable    Result Date: 1/7/2021  EXAMINATION: CHEST PORTABLE VIEW  CLINICAL HISTORY: NG tube placement COMPARISONS: January 7, 2021 0910 hours  FINDINGS: A total of 3 supine portable films are submitted. On the final third image the nasogastric tube now lies within the stomach. .  The cardiac silhouette is borderline enlarged. Pulmonary vascular unremarkable. Right sided trachea. Small area of atelectasis, patchy infiltrate left lower lobe. No Pneumothoraces.                                                                                    ON THE THIRD IMAGE NASOGASTRIC TUBE IS WITHIN THE STOMACH. SMALL AREA OF ATELECTASIS, PATCHY INFILTRATE LEFT LOWER LOBE     IMPRESSION AND SUGGESTION:  1. Acute encephalopathy, resolved  2. Delirium tremens, improved  3. Probable alcohol withdrawal seizure  4. Aspiration pneumonia, improve        He is not having any cough or shortness of breath. Chest x-ray showed left lower lobe consolidation. going home with po augmentin   Discharge today, f/u with PCP      Electronically signed by Domenica Bloch, MD, FCCP on 1/11/2021 at 3:30 PM

## 2021-01-11 NOTE — PROGRESS NOTES
Pt given d/c instructions, IV hep lock removed.  Electronically signed by Sandra Camejo RN on 1/11/2021 at 1:39 PM

## 2021-01-11 NOTE — CARE COORDINATION
LSW met with pt to offer support and assistance with obtaining CD treatment. Pt stated he had called [facility] and received information on available support groups. LSW encouraged pt to call this day to speak with someone about individual support and treatment options. Pt stated he is determined to follow through with not drinking and declined LSW assistance at this time.

## 2021-01-11 NOTE — PROGRESS NOTES
[facility] Daily Progress Note  Name: Miguel Ángel Arzola  Age: 38 y.o. Gender: male  CodeStatus: Full Code  Allergies: No Known Allergies    Chief Complaint:Altered Mental Status    Primary Care Provider: No primary care provider on file. InpatientTreatment Team: Treatment Team: Attending Provider: Tori Brady DO; Utilization Reviewer: Иван Mendez RN; Consulting Physician: Meagan Miller MD; Consulting Physician: Zeenat Robertson MD; : Wai Blevins RN; Registered Nurse: Sandra Camejo RN; : PONCHO Blanchard  Admission Date: 1/6/2021      HPI   Pt seen and examined for neuro follow up for seizure secondary to alcohol withdrawal and hyponatremia. Currently alert and oriented x3, no acute distress, cooperative. No further seizure activity. No focal deficits. Ambulating without difficulty. Taking p.o. well. Hyponatremia resolved. Denies dizziness today. Pt much improved    Vitals:    01/11/21 0908   BP: 119/70   Pulse: 64   Resp: 17   Temp: 98.4 °F (36.9 °C)   SpO2: 100%      Review of Systems   Constitutional: Negative for appetite change, chills, fatigue and fever. HENT: Negative for hearing loss and trouble swallowing. Eyes: Negative for visual disturbance. Respiratory: Negative for cough, chest tightness, shortness of breath and wheezing. Cardiovascular: Negative for chest pain, palpitations and leg swelling. Gastrointestinal: Negative for nausea and vomiting. Musculoskeletal: Negative for gait problem. Skin: Negative for color change and rash. Neurological: Negative for dizziness, tremors, seizures, syncope, facial asymmetry, speech difficulty, weakness, light-headedness, numbness and headaches. Psychiatric/Behavioral: Negative for agitation, confusion and hallucinations. The patient is not nervous/anxious. no symptoms  Physical Exam  Vitals signs and nursing note reviewed.    Constitutional:       General: He is not in acute 01/11/21  0604   * 437* 301*   ALT 55* 75* 76*   BILITOT 0.8* 0.5 0.5   ALKPHOS 62 68 65     No results for input(s): INR in the last 72 hours. No results for input(s): Onetha Elizabeth in the last 72 hours. Urinalysis:   Lab Results   Component Value Date    NITRU Negative 01/06/2021    WBCUA 0-2 01/06/2021    BACTERIA Negative 01/06/2021    RBCUA 3-5 01/06/2021    BLOODU SMALL 01/06/2021    SPECGRAV 1.004 01/06/2021    GLUCOSEU Negative 01/06/2021       Radiology:   Most recent    EEG No procedure found. MRI of Brain No results found for this or any previous visit. No results found for this or any previous visit. MRA of the Head and Neck: No results found for this or any previous visit. No results found for this or any previous visit. No results found for this or any previous visit. CT of the Head:   Results for orders placed during the hospital encounter of 01/06/21   CT Head WO Contrast    Narrative CT HEAD WO CONTRAST : 1/6/2021    CLINICAL HISTORY:  ams . COMPARISON: None available. TECHNIQUE: Spiral unenhanced images were obtained of the head, with routine multiplanar reconstructions performed. All CT scans at this facility use dose modulation, iterative reconstruction, and/or weight based dosing when appropriate to reduce radiation dose to as low as reasonably achievable. FINDINGS:    There is no intracranial hemorrhage, mass effect, midline shift, extra-axial collection, evidence of hydrocephalus, recent ischemic infarct, or skull fracture identified. There is no significant atrophy or white matter changes, for age. The mastoid air cells and visualized paranasal sinuses are essentially clear. Impression NO ACUTE INTRACRANIAL PROCESS IDENTIFIED. No results found for this or any previous visit. No results found for this or any previous visit.     Carotid duplex: No results found for this or any previous

## 2021-01-12 LAB
BLOOD CULTURE, ROUTINE: NORMAL
CULTURE, BLOOD 2: NORMAL

## 2021-01-18 ENCOUNTER — OFFICE VISIT (OUTPATIENT)
Dept: FAMILY MEDICINE CLINIC | Age: 36
End: 2021-01-18
Payer: COMMERCIAL

## 2021-01-18 VITALS
WEIGHT: 170.2 LBS | DIASTOLIC BLOOD PRESSURE: 84 MMHG | HEIGHT: 73 IN | HEART RATE: 72 BPM | TEMPERATURE: 97.2 F | SYSTOLIC BLOOD PRESSURE: 120 MMHG | OXYGEN SATURATION: 99 % | BODY MASS INDEX: 22.56 KG/M2

## 2021-01-18 DIAGNOSIS — E87.1 HYPONATREMIA: Primary | ICD-10-CM

## 2021-01-18 DIAGNOSIS — E87.1 HYPONATREMIA: ICD-10-CM

## 2021-01-18 DIAGNOSIS — F10.20 ALCOHOLISM (HCC): ICD-10-CM

## 2021-01-18 LAB
ALBUMIN SERPL-MCNC: 4.7 G/DL (ref 3.5–4.6)
ALP BLD-CCNC: 148 U/L (ref 35–104)
ALT SERPL-CCNC: 51 U/L (ref 0–41)
ANION GAP SERPL CALCULATED.3IONS-SCNC: 9 MEQ/L (ref 9–15)
AST SERPL-CCNC: 44 U/L (ref 0–40)
BILIRUB SERPL-MCNC: 0.3 MG/DL (ref 0.2–0.7)
BUN BLDV-MCNC: 10 MG/DL (ref 6–20)
CALCIUM SERPL-MCNC: 9.7 MG/DL (ref 8.5–9.9)
CHLORIDE BLD-SCNC: 100 MEQ/L (ref 95–107)
CO2: 30 MEQ/L (ref 20–31)
CREAT SERPL-MCNC: 0.76 MG/DL (ref 0.7–1.2)
GFR AFRICAN AMERICAN: >60
GFR NON-AFRICAN AMERICAN: >60
GLOBULIN: 3.2 G/DL (ref 2.3–3.5)
GLUCOSE BLD-MCNC: 91 MG/DL (ref 70–99)
POTASSIUM SERPL-SCNC: 4.9 MEQ/L (ref 3.4–4.9)
SODIUM BLD-SCNC: 139 MEQ/L (ref 135–144)
TOTAL PROTEIN: 7.9 G/DL (ref 6.3–8)

## 2021-01-18 PROCEDURE — 1111F DSCHRG MED/CURRENT MED MERGE: CPT | Performed by: NURSE PRACTITIONER

## 2021-01-18 PROCEDURE — G8427 DOCREV CUR MEDS BY ELIG CLIN: HCPCS | Performed by: NURSE PRACTITIONER

## 2021-01-18 PROCEDURE — 99203 OFFICE O/P NEW LOW 30 MIN: CPT | Performed by: NURSE PRACTITIONER

## 2021-01-18 PROCEDURE — G8484 FLU IMMUNIZE NO ADMIN: HCPCS | Performed by: NURSE PRACTITIONER

## 2021-01-18 PROCEDURE — G8420 CALC BMI NORM PARAMETERS: HCPCS | Performed by: NURSE PRACTITIONER

## 2021-01-18 PROCEDURE — 1036F TOBACCO NON-USER: CPT | Performed by: NURSE PRACTITIONER

## 2021-01-18 SDOH — ECONOMIC STABILITY: TRANSPORTATION INSECURITY
IN THE PAST 12 MONTHS, HAS LACK OF TRANSPORTATION KEPT YOU FROM MEETINGS, WORK, OR FROM GETTING THINGS NEEDED FOR DAILY LIVING?: NO

## 2021-01-18 ASSESSMENT — PATIENT HEALTH QUESTIONNAIRE - PHQ9
SUM OF ALL RESPONSES TO PHQ QUESTIONS 1-9: 0
2. FEELING DOWN, DEPRESSED OR HOPELESS: 0
1. LITTLE INTEREST OR PLEASURE IN DOING THINGS: 0

## 2021-01-18 ASSESSMENT — ENCOUNTER SYMPTOMS
COUGH: 0
SHORTNESS OF BREATH: 0

## 2021-01-18 NOTE — PROGRESS NOTES
Post-Discharge Transitional Care Management Services or Hospital Follow Up      Shira Alvarez   YOB: 1985    Date of Office Visit:  1/18/2021  Date of Hospital Admission: 1/6/21  Date of Hospital Discharge: 1/11/21  Readmission Risk Score(high >=14%.  Medium >=10%):Readmission Risk Score: 11      Care management risk score Rising risk (score 2-5) and Complex Care (Scores >=6): 0     Non face to face  following discharge, date last encounter closed (first attempt may have been earlier): *No documented post hospital discharge outreach found in the last 14 days *No documented post hospital discharge outreach found in the last 14 days    Call initiated 2 business days of discharge: *No response recorded in the last 14 days     Patient Active Problem List   Diagnosis    Hyponatremia    Altered mental status    Laceration of head without foreign body    Alcohol use       No Known Allergies    Medications listed as ordered at the time of discharge from hospital   [place], [address] Medication Instructions ANTHONY:    Printed on:01/18/21 1002   Medication Information                      folic acid (FOLVITE) 1 MG tablet  Take 1 tablet by mouth daily             Multiple Vitamin (MULTIVITAMIN) TABS tablet  Take 1 tablet by mouth daily             thiamine 100 MG tablet  Take 1 tablet by mouth daily                   Medications marked \"taking\" at this time  Outpatient Medications Marked as Taking for the 1/18/21 encounter (Office Visit) with ELBERT Martinez CNP   Medication Sig Dispense Refill    folic acid (FOLVITE) 1 MG tablet Take 1 tablet by mouth daily 30 tablet 3    Multiple Vitamin (MULTIVITAMIN) TABS tablet Take 1 tablet by mouth daily 30 tablet 0    thiamine 100 MG tablet Take 1 tablet by mouth daily 30 tablet 3        Medications patient taking as of now reconciled against medications ordered at time of hospital discharge: Yes    Chief Complaint   Patient presents with   [facility] Doctor    Follow-Up from Hospital     hyponatremia. HPI    Inpatient course: Discharge summary reviewed- see chart. Interval history/Current status: recently admitted into hospital for alcohol withdrawal, hyponatremia, DT's  Hasn't drank since discharged. 6-8 beers/night previously. Dx with aspiration pneumonia. No signs or symptoms currently. Denies dyspnea or fever    Declines counseling or support group    Review of Systems   Constitutional: Negative for fatigue. Respiratory: Negative for cough and shortness of breath. Cardiovascular: Negative for chest pain. Neurological: Negative for dizziness and seizures. Vitals:    01/18/21 0926   BP: 120/84   Site: Left Upper Arm   Position: Sitting   Cuff Size: Medium Adult   Pulse: 72   Temp: 97.2 °F (36.2 °C)   SpO2: 99%   Weight: 170 lb 3.2 oz (77.2 kg)   Height: 6' 1\" (1.854 m)     Body mass index is 22.46 kg/m². Wt Readings from Last 3 Encounters:   01/18/21 170 lb 3.2 oz (77.2 kg)   01/08/21 179 lb 3.2 oz (81.3 kg)     BP Readings from Last 3 Encounters:   01/18/21 120/84   01/11/21 119/70       Physical Exam  Vitals signs and nursing note reviewed. Constitutional:       Appearance: Normal appearance. He is normal weight. HENT:      Head: Normocephalic. Nose: Nose normal.      Mouth/Throat:      Mouth: Mucous membranes are moist.   Eyes:      Extraocular Movements: Extraocular movements intact. Conjunctiva/sclera: Conjunctivae normal.      Pupils: Pupils are equal, round, and reactive to light. Cardiovascular:      Rate and Rhythm: Normal rate and regular rhythm. Pulses: Normal pulses. Heart sounds: Normal heart sounds. Pulmonary:      Effort: Pulmonary effort is normal.      Breath sounds: Normal breath sounds. Skin:     General: Skin is warm. Neurological:      General: No focal deficit present. Mental Status: He is alert and oriented to person, place, and time. Mental status is at baseline. Psychiatric:         Mood and Affect: Mood normal.         Behavior: Behavior normal.         Thought Content: Thought content normal.         Judgment: Judgment normal.         Assessment/Plan:  1. Hyponatremia    - Comprehensive Metabolic Panel; Future  - DE DISCHARGE MEDS RECONCILED W/ CURRENT OUTPATIENT MED LIST    2. Alcoholism ([facility] [address]    - Comprehensive Metabolic Panel; Future        Side effects, adverse effects of the medication prescribed today, as well as treatment plan/ rationale and result expectations have been discussed with the patient who expresses understanding and desires to proceed. Close follow up to evaluate treatment results and for coordination of care. I have reviewed the patient's medical history in detail and updated the computerized patient record. As always, patient is advised that if symptoms worsen in any way they will proceed to the nearest emergency room. Medical Decision Making: moderate complexity    We will fu after bw. If Na level better pt may return to driving. Recommended counseling/support group. Pt not interested.